# Patient Record
Sex: FEMALE | Race: WHITE | NOT HISPANIC OR LATINO | Employment: UNEMPLOYED | URBAN - METROPOLITAN AREA
[De-identification: names, ages, dates, MRNs, and addresses within clinical notes are randomized per-mention and may not be internally consistent; named-entity substitution may affect disease eponyms.]

---

## 2022-07-05 ENCOUNTER — OFFICE VISIT (OUTPATIENT)
Dept: FAMILY MEDICINE CLINIC | Facility: CLINIC | Age: 22
End: 2022-07-05
Payer: COMMERCIAL

## 2022-07-05 VITALS
RESPIRATION RATE: 12 BRPM | BODY MASS INDEX: 30.46 KG/M2 | OXYGEN SATURATION: 98 % | SYSTOLIC BLOOD PRESSURE: 122 MMHG | DIASTOLIC BLOOD PRESSURE: 82 MMHG | HEART RATE: 90 BPM | HEIGHT: 65 IN | TEMPERATURE: 97.6 F | WEIGHT: 182.8 LBS

## 2022-07-05 DIAGNOSIS — Z12.4 SCREENING FOR CERVICAL CANCER: ICD-10-CM

## 2022-07-05 DIAGNOSIS — Z86.69 HISTORY OF SEIZURE DISORDER: ICD-10-CM

## 2022-07-05 DIAGNOSIS — I10 ESSENTIAL HYPERTENSION: ICD-10-CM

## 2022-07-05 DIAGNOSIS — Z00.00 ANNUAL PHYSICAL EXAM: Primary | ICD-10-CM

## 2022-07-05 PROCEDURE — 99385 PREV VISIT NEW AGE 18-39: CPT | Performed by: FAMILY MEDICINE

## 2022-07-05 RX ORDER — DIAZEPAM 10 MG/2ML
0.2 GEL RECTAL
COMMUNITY
End: 2022-07-05 | Stop reason: ALTCHOICE

## 2022-07-05 NOTE — ASSESSMENT & PLAN NOTE
BMI Counseling: Body mass index is 30 8 kg/m²  The BMI is above normal  Nutrition recommendations include reducing portion sizes, decreasing overall calorie intake and 3-5 servings of fruits/vegetables daily  Exercise recommendations include vigorous aerobic physical activity for 75 minutes/week

## 2022-07-05 NOTE — PROGRESS NOTES
[address] [facility]    NAME: Denise Alvarado  AGE: 23 y o  SEX: female  : 2000     DATE: 2022     Assessment and Plan:     Problem List Items Addressed This Visit        Cardiovascular and Mediastinum    Essential hypertension     Was elevated when on OCPs but has been normal off birth control pills               Other    History of seizure disorder     Patient reports she had seizures around 71 years of age  Reports they never found a cause, she was on medication but has been off medication and seizure free since age 17  BMI 30 0-30 9,adult     BMI Counseling: Body mass index is 30 8 kg/m²  The BMI is above normal  Nutrition recommendations include reducing portion sizes, decreasing overall calorie intake and 3-5 servings of fruits/vegetables daily  Exercise recommendations include vigorous aerobic physical activity for 75 minutes/week  Other Visit Diagnoses     Annual physical exam    -  Primary    Screening for cervical cancer        Relevant Orders    Ambulatory referral to Obstetrics / Gynecology          Immunizations and preventive care screenings were discussed with patient today  Appropriate education was printed on patient's after visit summary  Counseling:  Alcohol/drug use: discussed moderation in alcohol intake, the recommendations for healthy alcohol use, and avoidance of illicit drug use  Dental Health: discussed importance of regular tooth brushing, flossing, and dental visits  Injury prevention: discussed safety/seat belts, safety helmets, smoke detectors, carbon dioxide detectors, and smoking near bedding or upholstery  Sexual health: discussed sexually transmitted diseases, partner selection, use of condoms, avoidance of unintended pregnancy, and contraceptive alternatives  · Exercise: the importance of regular exercise/physical activity was discussed   Recommend exercise 3-5 times per week for at least 30 minutes  Return in about 1 year (around 7/5/2023) for Annual physical      Chief Complaint:     Chief Complaint   Patient presents with   [facility]     NP had high BP in the past      History of Present Illness:     Adult Annual Physical   Patient here for a comprehensive physical exam  She is a new patient  Her mother works for [employer] in reception at [employer]  Diet and Physical Activity  · Diet/Nutrition: well balanced diet  · Exercise: moderate cardiovascular exercise  Depression Screening  PHQ-2/9 Depression Screening    Little interest or pleasure in doing things: 0 - not at all  Feeling down, depressed, or hopeless: 0 - not at all  PHQ-2 Score: 0  PHQ-2 Interpretation: Negative depression screen         /GYN Health  · Needs to see ob/gyn     Review of Systems:     Review of Systems   Constitutional: Negative for chills, fatigue and fever  HENT: Negative for congestion, postnasal drip, rhinorrhea and sinus pressure  Eyes: Negative for photophobia and visual disturbance  Respiratory: Negative for cough and shortness of breath  Cardiovascular: Negative for chest pain, palpitations and leg swelling  Gastrointestinal: Negative for abdominal pain, constipation, diarrhea, nausea and vomiting  Genitourinary: Negative for difficulty urinating and dysuria  Musculoskeletal: Negative for arthralgias and myalgias  Skin: Negative for color change and rash  Neurological: Negative for dizziness, weakness, light-headedness and headaches  Past Medical History:     History reviewed  No pertinent past medical history  Past Surgical History:     History reviewed  No pertinent surgical history     Social History:     Social History     Socioeconomic History    Marital status: Single     Spouse name: None    Number of children: None    Years of education: None    Highest education level: None   Occupational History    None   Tobacco Use    Smoking status: Never Smoker    Smokeless tobacco: Never Used   Vaping Use    Vaping Use: Never used   Substance and Sexual Activity    Alcohol use: Yes     Comment: socially    Drug use: Never    Sexual activity: None   Other Topics Concern    None   Social History Narrative    None     Social Determinants of Health     Financial Resource Strain: Not on file   Food Insecurity: Not on file   Transportation Needs: Not on file   Physical Activity: Not on file   Stress: Not on file   Social Connections: Not on file   Intimate Partner Violence: Not At Risk    Fear of Current or Ex-Partner: No    Emotionally Abused: No    Physically Abused: No    Sexually Abused: No   Housing Stability: Not on file      Family History:     History reviewed  No pertinent family history  Current Medications:     No current outpatient medications on file  No current facility-administered medications for this visit  Allergies: Allergies   Allergen Reactions    Amoxicillin Other (See Comments), Fever and Rash     Other reaction(s): Nausea and/or vomiting    Oxycodone Fever, Vomiting and Rash      Physical Exam:     /82 (BP Location: Left arm, Patient Position: Sitting, Cuff Size: Large)   Pulse 90   Temp 97 6 °F (36 4 °C)   Resp 12   Ht 5' 4 6" (1 641 m)   Wt 82 9 kg (182 lb 12 8 oz)   SpO2 98%   Breastfeeding No   BMI 30 80 kg/m²     Physical Exam  Constitutional:       General: She is not in acute distress  Appearance: Normal appearance  She is not ill-appearing, toxic-appearing or diaphoretic  HENT:      Head: Normocephalic and atraumatic  Right Ear: Tympanic membrane and ear canal normal       Left Ear: Tympanic membrane and ear canal normal       Nose: Nose normal  No congestion  Mouth/Throat:      Mouth: Mucous membranes are moist       Pharynx: Oropharynx is clear  No oropharyngeal exudate  Eyes:      Extraocular Movements: Extraocular movements intact  Conjunctiva/sclera: Conjunctivae normal       Pupils: Pupils are equal, round, and reactive to light  Cardiovascular:      Rate and Rhythm: Normal rate and regular rhythm  Pulses: Normal pulses  Heart sounds: No murmur heard  Pulmonary:      Effort: Pulmonary effort is normal       Breath sounds: Normal breath sounds  No wheezing, rhonchi or rales  Abdominal:      General: Bowel sounds are normal  There is no distension  Palpations: Abdomen is soft  Tenderness: There is no abdominal tenderness  Musculoskeletal:         General: No swelling or tenderness  Normal range of motion  Cervical back: Normal range of motion and neck supple  Skin:     General: Skin is warm and dry  Capillary Refill: Capillary refill takes less than 2 seconds  Neurological:      General: No focal deficit present  Mental Status: She is alert and oriented to person, place, and time  Cranial Nerves: No cranial nerve deficit  Psychiatric:         Mood and Affect: Mood normal          Behavior: Behavior normal          Thought Content:  Thought content normal           Chery Capps DO   [address]

## 2022-07-05 NOTE — ASSESSMENT & PLAN NOTE
Patient reports she had seizures around 71 years of age  Reports they never found a cause, she was on medication but has been off medication and seizure free since age 17

## 2022-07-05 NOTE — PATIENT INSTRUCTIONS

## 2022-11-03 DIAGNOSIS — Z86.69 HISTORY OF SEIZURE DISORDER: Primary | ICD-10-CM

## 2022-11-09 ENCOUNTER — OFFICE VISIT (OUTPATIENT)
Dept: URGENT CARE | Facility: CLINIC | Age: 22
End: 2022-11-09

## 2022-11-09 VITALS
WEIGHT: 185 LBS | OXYGEN SATURATION: 100 % | HEART RATE: 70 BPM | DIASTOLIC BLOOD PRESSURE: 70 MMHG | BODY MASS INDEX: 31.58 KG/M2 | SYSTOLIC BLOOD PRESSURE: 110 MMHG | TEMPERATURE: 98.5 F | HEIGHT: 64 IN | RESPIRATION RATE: 16 BRPM

## 2022-11-09 DIAGNOSIS — R30.0 DYSURIA: ICD-10-CM

## 2022-11-09 DIAGNOSIS — N30.00 ACUTE CYSTITIS WITHOUT HEMATURIA: Primary | ICD-10-CM

## 2022-11-09 LAB
SL AMB  POCT GLUCOSE, UA: NEGATIVE
SL AMB LEUKOCYTE ESTERASE,UA: ABNORMAL
SL AMB POCT BILIRUBIN,UA: NEGATIVE
SL AMB POCT BLOOD,UA: NEGATIVE
SL AMB POCT CLARITY,UA: ABNORMAL
SL AMB POCT COLOR,UA: YELLOW
SL AMB POCT KETONES,UA: NEGATIVE
SL AMB POCT NITRITE,UA: NEGATIVE
SL AMB POCT PH,UA: 6.5
SL AMB POCT SPECIFIC GRAVITY,UA: 1
SL AMB POCT URINE PROTEIN: NEGATIVE
SL AMB POCT UROBILINOGEN: 0.2

## 2022-11-09 RX ORDER — PHENAZOPYRIDINE HYDROCHLORIDE 100 MG/1
100 TABLET, FILM COATED ORAL 3 TIMES DAILY PRN
Qty: 6 TABLET | Refills: 0 | Status: SHIPPED | OUTPATIENT
Start: 2022-11-09 | End: 2022-11-11

## 2022-11-09 RX ORDER — NITROFURANTOIN 25; 75 MG/1; MG/1
100 CAPSULE ORAL 2 TIMES DAILY
Qty: 14 CAPSULE | Refills: 0 | Status: SHIPPED | OUTPATIENT
Start: 2022-11-09 | End: 2022-11-16

## 2022-11-09 NOTE — PATIENT INSTRUCTIONS
Urinary Tract Infection in Women   WHAT YOU NEED TO KNOW:   A urinary tract infection (UTI) is caused by bacteria that get inside your urinary tract  Most bacteria that enter your urinary tract come out when you urinate  If the bacteria stay in your urinary tract, you may get an infection  Your urinary tract includes your kidneys, ureters, bladder, and urethra  Urine is made in your kidneys, and it flows from the ureters to the bladder  Urine leaves the bladder through the urethra  A UTI is more common in your lower urinary tract, which includes your bladder and urethra  DISCHARGE INSTRUCTIONS:   Return to the emergency department if:   You are urinating very little or not at all  You have a high fever with shaking chills  You have side or back pain that gets worse  Call your doctor if:   You have a fever  You do not feel better after 2 days of taking antibiotics  You are vomiting  You have questions or concerns about your condition or care  Medicines:   Antibiotics  help fight a bacterial infection  If you have UTIs often (called recurrent UTIs), you may be given antibiotics to take regularly  You will be given directions for when and how to use antibiotics  The goal is to prevent UTIs but not cause antibiotic resistance by using antibiotics too often  Medicines  may be given to decrease pain and burning when you urinate  They will also help decrease the feeling that you need to urinate often  These medicines will make your urine orange or red  Take your medicine as directed  Contact your healthcare provider if you think your medicine is not helping or if you have side effects  Tell him or her if you are allergic to any medicine  Keep a list of the medicines, vitamins, and herbs you take  Include the amounts, and when and why you take them  Bring the list or the pill bottles to follow-up visits  Carry your medicine list with you in case of an emergency      Follow up with your doctor as directed:  Write down your questions so you remember to ask them during your visits  Prevent another UTI:   Empty your bladder often  Urinate and empty your bladder as soon as you feel the need  Do not hold your urine for long periods of time  Wipe from front to back after you urinate or have a bowel movement  This will help prevent germs from getting into your urinary tract through your urethra  Drink liquids as directed  Ask how much liquid to drink each day and which liquids are best for you  You may need to drink more liquids than usual to help flush out the bacteria  Do not drink alcohol, caffeine, or citrus juices  These can irritate your bladder and increase your symptoms  Your healthcare provider may recommend cranberry juice to help prevent a UTI  Urinate after you have sex  This can help flush out bacteria passed during sex  Do not douche or use feminine deodorants  These can change the chemical balance in your vagina  Change sanitary pads or tampons often  This will help prevent germs from getting into your urinary tract  Talk to your healthcare provider about your birth control method  You may need to change your method if it is increasing your risk for UTIs  Wear cotton underwear and clothes that are loose  Tight pants and nylon underwear can trap moisture and cause bacteria to grow  Vaginal estrogen may be recommended  This medicine helps prevent UTIs in women who have gone through menopause or are in joyce-menopause  Do pelvic muscle exercises often  Pelvic muscle exercises may help you start and stop urinating  Strong pelvic muscles may help you empty your bladder easier  Squeeze these muscles tightly for 5 seconds like you are trying to hold back urine  Then relax for 5 seconds  Gradually work up to squeezing for 10 seconds  Do 3 sets of 15 repetitions a day, or as directed      © Copyright [employer] 2022 Information is for End User's use only and may not be sold, redistributed or otherwise used for commercial purposes  All illustrations and images included in CareNotes® are the copyrighted property of A D A M , Inc  or Milag[employer] Denise  The above information is an  only  It is not intended as medical advice for individual conditions or treatments  Talk to your doctor, nurse or pharmacist before following any medical regimen to see if it is safe and effective for you

## 2022-11-11 LAB — BACTERIA UR CULT: NORMAL

## 2022-12-12 ENCOUNTER — APPOINTMENT (OUTPATIENT)
Dept: RADIOLOGY | Facility: CLINIC | Age: 22
End: 2022-12-12

## 2022-12-12 ENCOUNTER — OFFICE VISIT (OUTPATIENT)
Dept: URGENT CARE | Facility: CLINIC | Age: 22
End: 2022-12-12

## 2022-12-12 VITALS
HEART RATE: 87 BPM | WEIGHT: 180 LBS | RESPIRATION RATE: 18 BRPM | BODY MASS INDEX: 30.73 KG/M2 | HEIGHT: 64 IN | TEMPERATURE: 97.9 F | OXYGEN SATURATION: 98 %

## 2022-12-12 DIAGNOSIS — M26.623 BILATERAL TEMPOROMANDIBULAR JOINT PAIN: ICD-10-CM

## 2022-12-12 DIAGNOSIS — S93.401A SPRAIN OF RIGHT ANKLE, UNSPECIFIED LIGAMENT, INITIAL ENCOUNTER: Primary | ICD-10-CM

## 2022-12-12 DIAGNOSIS — S93.401A SPRAIN OF RIGHT ANKLE, UNSPECIFIED LIGAMENT, INITIAL ENCOUNTER: ICD-10-CM

## 2022-12-12 NOTE — PROGRESS NOTES
[id number] [facility]        NAME: Vilma Sharma is a 23 y o  female  : 2000    MRN: [id number]  DATE: 2022  TIME: 2:01 PM    Assessment and Plan   Sprain of right ankle, unspecified ligament, initial encounter [S93 [id number]]  1  Sprain of right ankle, unspecified ligament, initial encounter  XR ankle 3+ vw right    Ambulatory Referral to Orthopedic Surgery    Ambulatory Referral to Physical Therapy      2  Bilateral temporomandibular joint pain          Xray of right ankle- No acute fracture or dislocations  Stable ankle mortise  Pending radiology report  Patient Instructions       Patient was educated on B/L TMJ pain  Patient was educated on taking OTC Tylenol and anti-inflammatory for pain  Patient was educated on right ankle sprain  Patient was educated on icing and taking OTC Tylenol and anti-inflammatory for pain  Patient was given a referral to ortho and formal PT  Chief Complaint     Chief Complaint   Patient presents with   • Earache     Pt reports bilateral ear fullness and pressure for several weeks  Managing symptoms at home with otc medication without any relief  Denies any fever  Reports some sinus congestion and headache  • Ankle Injury     Pt reports right ankle pain resulting from a "rolling" injury last night  Managing symptoms with ice last night and this morning with some symptom relief  History of Present Illness       Patient is here today complaining of B/L ear congestion off an on for a few weeks  Patient reports allergy to amoxicillin and oxycodone  Denies any history of asthma or diabetes  Patient also reports twisted right ankle walking out of her friends house last night 24  Patient admits prior right ankle injuries a while ago  Patient is taking OTC ibuprofen with no relief  Review of Systems   Review of Systems   Constitutional: Negative  HENT: Positive for ear pain  Respiratory: Negative  Cardiovascular: Negative  Musculoskeletal:        Right ankle pain   Psychiatric/Behavioral: Negative  Current Medications     No current outpatient medications on file  Current Allergies     Allergies as of 12/12/2022 - Reviewed 12/12/2022   Allergen Reaction Noted   • Amoxicillin Other (See Comments), Fever, and Rash 12/23/2019   • Oxycodone Fever, Vomiting, and Rash 12/23/2019            The following portions of the patient's history were reviewed and updated as appropriate: allergies, current medications, past family history, past medical history, past social history, past surgical history and problem list      History reviewed  No pertinent past medical history  History reviewed  No pertinent surgical history  History reviewed  No pertinent family history  Medications have been verified  Objective   Pulse 87   Temp 97 9 °F (36 6 °C)   Resp 18   Ht 5' 4" (1 626 m)   Wt 81 6 kg (180 lb)   SpO2 98%   BMI 30 90 kg/m²   No LMP recorded  Physical Exam     Physical Exam  Vitals and nursing note reviewed  Constitutional:       Appearance: Normal appearance  HENT:      Head: Normocephalic  Right Ear: Tympanic membrane, ear canal and external ear normal       Left Ear: Tympanic membrane, ear canal and external ear normal       Ears:      Comments: Pain over B/L TM when opening and closing mouth     Mouth/Throat:      Mouth: Mucous membranes are moist       Pharynx: No oropharyngeal exudate or posterior oropharyngeal erythema  Eyes:      Extraocular Movements: Extraocular movements intact  Pupils: Pupils are equal, round, and reactive to light  Cardiovascular:      Rate and Rhythm: Normal rate and regular rhythm  Heart sounds: Normal heart sounds  Pulmonary:      Breath sounds: Normal breath sounds  No wheezing  Musculoskeletal:      Comments: No pain over medial or lateral malleolus of right ankle  DF/PF/ inversion and eversion intact with pain in right ankle with inversion  Negative Anterior Drawer and Talar tilt of right ankle  No pain over right calf  NO pain over right meta-tarsals  Neurological:      General: No focal deficit present  Mental Status: She is alert and oriented to person, place, and time     Psychiatric:         Mood and Affect: Mood normal          Behavior: Behavior normal

## 2022-12-12 NOTE — PATIENT INSTRUCTIONS
Patient was educated on B/L TMJ pain  Patient was educated on taking OTC Tylenol and anti-inflammatory for pain  Patient was educated on right ankle sprain  Patient was educated on icing and taking OTC Tylenol and anti-inflammatory for pain  Patient was given a referral to ortho and formal PT  Ankle Sprain   WHAT YOU NEED TO KNOW:   An ankle sprain happens when 1 or more ligaments in your ankle joint stretch or tear  Ligaments are tough tissues that connect bones  Ligaments support your joints and keep your bones in place  DISCHARGE INSTRUCTIONS:   Return to the emergency department if:   You have severe pain in your ankle  Your foot or toes are cold or numb  Your ankle becomes more weak or unstable (wobbly)  You are unable to put any weight on your ankle or foot  Your swelling has increased or returned  Call your doctor if:   Your pain does not go away, even after treatment  You have questions or concerns about your condition or care  Medicines: You may need any of the following:  NSAIDs , such as ibuprofen, help decrease swelling, pain, and fever  This medicine is available with or without a doctor's order  NSAIDs can cause stomach bleeding or kidney problems in certain people  If you take blood thinner medicine, always ask your healthcare provider if NSAIDs are safe for you  Always read the medicine label and follow directions  Acetaminophen  decreases pain and fever  It is available without a doctor's order  Ask how much to take and how often to take it  Follow directions  Read the labels of all other medicines you are using to see if they also contain acetaminophen, or ask your doctor or pharmacist  Acetaminophen can cause liver damage if not taken correctly  Do not use more than 4 grams (4,000 milligrams) total of acetaminophen in one day  Prescription pain medicine  may be given  Ask your healthcare provider how to take this medicine safely   Some prescription pain medicines contain acetaminophen  Do not take other medicines that contain acetaminophen without talking to your healthcare provider  Too much acetaminophen may cause liver damage  Prescription pain medicine may cause constipation  Ask your healthcare provider how to prevent or treat constipation  Take your medicine as directed  Contact your healthcare provider if you think your medicine is not helping or if you have side effects  Tell him or her if you are allergic to any medicine  Keep a list of the medicines, vitamins, and herbs you take  Include the amounts, and when and why you take them  Bring the list or the pill bottles to follow-up visits  Carry your medicine list with you in case of an emergency  Self-care:   Use support devices,  such as a brace, cast, or splint, to limit your movement and protect your joint  You may need to use crutches to decrease your pain as you move around  Go to physical therapy as directed  A physical therapist teaches you exercises to help improve movement and strength, and to decrease pain  Rest  your ankle so that it can heal  Return to normal activities as directed  Apply ice  on your ankle for 15 to 20 minutes every hour or as directed  Use an ice pack, or put crushed ice in a plastic bag  Cover it with a towel  Ice helps prevent tissue damage and decreases swelling and pain  Compress  your ankle  Ask if you should wrap an elastic bandage around your injured ligament  An elastic bandage provides support and helps decrease swelling and movement so your joint can heal  Wear as long as directed  Elevate  your ankle above the level of your heart as often as you can  This will help decrease swelling and pain  Prop your ankle on pillows or blankets to keep it elevated comfortably         Prevent another ankle sprain:   Let your ankle heal   Find out how long your ligament needs to heal  Do not do any physical activity until your healthcare provider says it is okay  If you start activity too soon, you may develop a more serious injury  Always warm up and stretch  before you exercise or play sports  Use the right equipment  Always wear shoes that fit well and are made for the activity that you are doing  You may also need ankle supports, elbow and knee pads, or braces  Follow up with your doctor as directed:  Write down your questions so you remember to ask them during your visits  © Copyright [employer] 2022 Information is for End User's use only and may not be sold, redistributed or otherwise used for commercial purposes  All illustrations and images included in CareNotes® are the copyrighted property of [employer] M , Inc  or [facility] Sandeep Fofana   The above information is an  only  It is not intended as medical advice for individual conditions or treatments  Talk to your doctor, nurse or pharmacist before following any medical regimen to see if it is safe and effective for you

## 2022-12-20 ENCOUNTER — OFFICE VISIT (OUTPATIENT)
Dept: OBGYN CLINIC | Facility: CLINIC | Age: 22
End: 2022-12-20

## 2022-12-20 VITALS
DIASTOLIC BLOOD PRESSURE: 82 MMHG | WEIGHT: 190.6 LBS | SYSTOLIC BLOOD PRESSURE: 122 MMHG | BODY MASS INDEX: 32.54 KG/M2 | HEIGHT: 64 IN

## 2022-12-20 DIAGNOSIS — S93.491A SPRAIN OF ANTERIOR TALOFIBULAR LIGAMENT OF RIGHT ANKLE, INITIAL ENCOUNTER: Primary | ICD-10-CM

## 2022-12-20 NOTE — PROGRESS NOTES
Assessment:     1  Sprain of anterior talofibular ligament of right ankle, initial encounter        Plan:  Problem List Items Addressed This Visit        Musculoskeletal and Integument    Sprain of anterior talofibular ligament of right ankle - Primary     The patient has an examination consistent with a right ankle sprain  I have discussed with the patient the pathophysiology of this diagnosis and reviewed how the examination correlates with this diagnosis  Treatment options were discussed at length and after discussing these treatment options, the patient was instructed in home exercise program   Start with range of motion, progress to writing the alphabet  Formal referral was placed for physical therapy  Patient will follow up in 6 weeks for re-evaluation  All patient's questions were answered to her satisfaction  This note is created using dictation transcription  It may contain typographical errors, grammatical errors, improperly dictated words, background noise and other errors  Relevant Orders    Ambulatory Referral to Physical Therapy    Durable Medical Equipment      Subjective:     Patient ID: Mary Gregory is a 23 y o  female  Chief Complaint:  The patient presents with a chief complaint of right ankle pain  Patient is referred here for an urgent care  the pain began 9 day(s) ago and is associated with an acute injury  Patient reports she rolled her ankle on 12/11/22  The patient describes the pain as aching and dull in intensity,  intermittent in timing, and localizes the pain to the  right anterolateral ankle  The pain is worse with standing and walking and relieved by rest   The pain is not associated with numbness and tingling  The pain is not associated with constitutional symptoms  The patient is not awoken at night by the pain  The patient was seen at urgent care on 12/12/22 , placed in an ace wrap and referred here today for orthopedic consultation    Patient is a  and in nursing school  She reports being on her feet for extended periods of time which increases her pain  Information on patient's intake form was reviewed  Allergy:  Allergies   Allergen Reactions   • Amoxicillin Other (See Comments), Fever and Rash     Other reaction(s): Nausea and/or vomiting   • Oxycodone Fever, Vomiting and Rash     Medications:  all current active meds have been reviewed  Past Medical History:  No past medical history on file  Past Surgical History:  No past surgical history on file  Family History:  No family history on file  Social History:  Social History     Substance and Sexual Activity   Alcohol Use Yes    Comment: socially     Social History     Substance and Sexual Activity   Drug Use Never     Social History     Tobacco Use   Smoking Status Never   Smokeless Tobacco Never     Review of Systems   Constitutional: Negative for chills and fever  HENT: Negative for drooling and sneezing  Eyes: Negative for redness  Respiratory: Negative for cough and wheezing  Cardiovascular: Negative  Gastrointestinal: Negative for nausea and vomiting  Endocrine: Negative  Genitourinary: Negative  Musculoskeletal: Positive for arthralgias (Right ankle), gait problem (Antalgic) and joint swelling (Right ankle)  Please see ortho exam   Psychiatric/Behavioral: Negative for behavioral problems  The patient is not nervous/anxious  Objective:  BP Readings from Last 1 Encounters:   12/20/22 122/82      Wt Readings from Last 1 Encounters:   12/20/22 86 5 kg (190 lb 9 6 oz)      BMI:   Estimated body mass index is 32 72 kg/m² as calculated from the following:    Height as of this encounter: 5' 4" (1 626 m)  Weight as of this encounter: 86 5 kg (190 lb 9 6 oz)  BSA:   Estimated body surface area is 1 92 meters squared as calculated from the following:    Height as of this encounter: 5' 4" (1 626 m)  Weight as of this encounter: 86 5 kg (190 lb 9 6 oz)     Physical Exam  Vitals and nursing note reviewed  Constitutional:       Appearance: Normal appearance  She is well-developed  HENT:      Head: Normocephalic and atraumatic  Right Ear: External ear normal       Left Ear: External ear normal    Eyes:      Extraocular Movements: Extraocular movements intact  Conjunctiva/sclera: Conjunctivae normal    Pulmonary:      Effort: Pulmonary effort is normal    Musculoskeletal:      Cervical back: Neck supple  Skin:     General: Skin is warm and dry  Neurological:      Mental Status: She is alert and oriented to person, place, and time  Deep Tendon Reflexes: Reflexes are normal and symmetric  Psychiatric:         Mood and Affect: Mood normal          Behavior: Behavior normal        Right Ankle Exam     Tenderness   The patient is experiencing tenderness in the ATF  Swelling: mild    Range of Motion   Right ankle dorsiflexion: Pain  Right ankle inversion: Pain  Tests   Anterior drawer: negative  Varus tilt: negative    Other   Erythema: absent  Sensation: normal  Pulse: present             I have personally reviewed pertinent films in PACS and my interpretation is Right ankle x-rays demonstrates no fracture or dislocation  Mortise and syndesmosis are intact       Scribe Attestation    I,:  Lily Villarreal am acting as a scribe while in the presence of the attending physician :       I,:  Bhavani Diaz MD personally performed the services described in this documentation    as scribed in my presence :

## 2022-12-20 NOTE — ASSESSMENT & PLAN NOTE
The patient has an examination consistent with a right ankle sprain  I have discussed with the patient the pathophysiology of this diagnosis and reviewed how the examination correlates with this diagnosis  Treatment options were discussed at length and after discussing these treatment options, the patient was instructed in home exercise program   Start with range of motion, progress to writing the alphabet  Formal referral was placed for physical therapy  Patient will follow up in 6 weeks for re-evaluation  All patient's questions were answered to her satisfaction  This note is created using dictation transcription  It may contain typographical errors, grammatical errors, improperly dictated words, background noise and other errors

## 2022-12-29 ENCOUNTER — EVALUATION (OUTPATIENT)
Dept: PHYSICAL THERAPY | Facility: CLINIC | Age: 22
End: 2022-12-29

## 2022-12-29 DIAGNOSIS — S93.491D SPRAIN OF ANTERIOR TALOFIBULAR LIGAMENT OF RIGHT ANKLE, SUBSEQUENT ENCOUNTER: Primary | ICD-10-CM

## 2022-12-29 NOTE — PROGRESS NOTES
PT Evaluation     Today's date: 2022  Patient name: Stepan Cox  : 2000  MRN: [id number]  Referring provider: Carlo Fernández MD  Dx:   Encounter Diagnosis     ICD-10-CM    1  Sprain of anterior talofibular ligament of right ankle, subsequent encounter  S93 [id number] Ambulatory Referral to Physical Therapy                     Assessment  Assessment details: Stepan Cox is a 23 y o  female who presents with pain, decreased strength, decreased ROM, decreased joint mobility and ambulatory dysfunction  Due to these impairments, patient has difficulty performing a/iadls, recreational activities, work-related activities and engaging in social activities  Patient's clinical presentation is consistent with their referring diagnosis of Sprain of anterior talofibular ligament of right ankle, subsequent encounter  Patient has been educated in home exercise program and plan of care  Patient would benefit from skilled physical therapy services to address their aforementioned functional limitations and progress towards prior level of function and independence with home exercise program    Impairments: abnormal gait, abnormal muscle firing, abnormal or restricted ROM, activity intolerance, impaired balance, impaired physical strength, lacks appropriate home exercise program, pain with function and weight-bearing intolerance    Symptom irritability: moderateUnderstanding of Dx/Px/POC: good   Prognosis: good    Goals  Short Term Goals: Target Date 4 weeks  1  Pt will initiate and advance HEP  2  Pt will have < 3/10 pain  3  Pt will have full arom of the right ankle  4  Pt will be able to stand 1/2 day with out difficulty    Long Term Goals: Target Date 8 weeks  1  Pt will demonstrate independence in HEP  2  Pt will have <1/10 pain  3  Pt will have full core and B LE strength  4  Pt will be able to stand full day for work with out difficulty  5   Pt will be able to negotiate stairs reciprocally       Plan  Patient would benefit from: skilled PT  Planned modality interventions: cryotherapy and thermotherapy: hydrocollator packs  Planned therapy interventions: joint mobilization, manual therapy, patient education, postural training, activity modification, body mechanics training, flexibility, functional ROM exercises, graded exercise, home exercise program, neuromuscular re-education, strengthening, stretching, therapeutic activities, therapeutic exercise, gait training, balance/weight bearing training and ADL training  Frequency: 1x week  Duration in weeks: 8  Plan of Care beginning date: 2022  Plan of Care expiration date: 2023  Treatment plan discussed with: patient        Subjective Evaluation    History of Present Illness  Mechanism of injury: Pt notes that she slipped on a doormat when it was raining  She did go to care now, and then a week later followed orthopedics  He prescribed PT and an ankle brace  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  She notes that it doesn't start bothering her until the end of the day  She does take ibuprofen and ice post work  She is also a student but on break until   She notes some twists of the ankle in past but nothing that required specific treatment  She does note that she did recently hurt it again when she slipped on the edge of a curb, but did have brace on and only  More sore now  Pain  Current pain ratin  At best pain ratin  At worst pain ratin  Location: right ankle  Quality: dull ache, discomfort and throbbing    Patient Goals  Patient goals for therapy: decreased pain, increased motion, independence with ADLs/IADLs, increased strength and return to sport/leisure activities  Patient goal: return to walking on TM w/ incline        Objective     Observations     Right Ankle/Foot   Positive for edema  Negative for deformity and trophic changes       Additional Observation Details  - bruising    Palpation     Right   Tenderness of the anterior tibialis and peroneus  Tenderness     Right Ankle/Foot   Tenderness in the anterior talofibular ligament, calcaneofibular ligament and deltoid ligament  Active Range of Motion   Left Ankle/Foot   Dorsiflexion (ke): 10 degrees   Plantar flexion: WFL  Inversion: WFL  Eversion: WFL    Right Ankle/Foot   Dorsiflexion (ke): 12 degrees with pain  Plantar flexion: WFL and with pain  Inversion: WFL and with pain  Eversion: WFL and with pain    Joint Play     Right Ankle/Foot  Hypomobile in the talocrural joint and subtalar joint  Strength/Myotome Testing     Left Ankle/Foot   Dorsiflexion: 4+  Plantar flexion: 4+  Inversion: 4+  Eversion: 4+    Right Ankle/Foot   Dorsiflexion: 4  Plantar flexion: 4-  Inversion: 4  Eversion: 4    Tests     Right Ankle/Foot   Positive for anterior drawer and navicular drop  Swelling   Left Ankle/Foot   Figure 8: 48 2 cm    Right Ankle/Foot   Figure 8: 48 cm    Ambulation     Observational Gait   Gait: antalgic     Functional Assessment        Single Leg Stance   Left: 30 seconds  Right: 20 (increased ankle compensation and navicular drop) seconds    General Comments:       Ankle/Foot Comments   Slight pes planus on the right in SLS             Precautions: DOI 12/11/2022      Manuals 12/29            ktape PF, arch, and stirrup DB                                                   Neuro Re-Ed             Balance brds nv            SLS nv            Arch lifts nv            Tandem ball toss    nv            Ankle alphabet nv                                      Ther Ex             bike nv            Ankle 4 way  blue            HR/TR nv            Step stretch nv                                                                Ther Activity                                       Gait Training                                       Modalities

## 2023-01-04 ENCOUNTER — OFFICE VISIT (OUTPATIENT)
Dept: PHYSICAL THERAPY | Facility: CLINIC | Age: 23
End: 2023-01-04

## 2023-01-04 DIAGNOSIS — S93.491D SPRAIN OF ANTERIOR TALOFIBULAR LIGAMENT OF RIGHT ANKLE, SUBSEQUENT ENCOUNTER: Primary | ICD-10-CM

## 2023-01-04 NOTE — PROGRESS NOTES
Daily Note     Today's date: 2023  Patient name: Shaun Westbrook  : 2000  MRN: [id number]  Referring provider: Johnson Robb MD  Dx:   Encounter Diagnosis     ICD-10-CM    1  Sprain of anterior talofibular ligament of right ankle, subsequent encounter  [id number]                      Subjective: pt notes that she feels like the kinesiotape did help and that she did well with HEP      Objective: See treatment diary below      Assessment: Additional exercises initiated exercises as noted below, with no pain increase in ankle, just weakness noted    Patient was challenge by arch lift but able to perform with SLS and tandem exercises  Updated HEP  Discussed if she feels she needs to wear ankle brace to wear but that she does not have to if she feels she doesn't need      Plan: Continue per plan of care        Precautions: DOI 2022      Manuals            ktape PF, arch, and stirrup DB DL                                                  Neuro Re-Ed             Balance brds nv x20           SLS nv 15"x5           Arch lifts nv 5"x10           Tandem ball toss    nv Red ball x10 ea           Ankle alphabet nv x1                                     Ther Ex             bike nv lv1 6'           Ankle 4 way  blue Blue 2x10           HR/TR nv 2x10           Step stretch nv 30"x3                                                               Ther Activity                                       Gait Training                                       Modalities

## 2023-01-11 ENCOUNTER — OFFICE VISIT (OUTPATIENT)
Dept: PHYSICAL THERAPY | Facility: CLINIC | Age: 23
End: 2023-01-11

## 2023-01-11 DIAGNOSIS — S93.491D SPRAIN OF ANTERIOR TALOFIBULAR LIGAMENT OF RIGHT ANKLE, SUBSEQUENT ENCOUNTER: Primary | ICD-10-CM

## 2023-01-11 NOTE — PROGRESS NOTES
Daily Note     Today's date: 2023  Patient name: Kimo Burns  : 2000  MRN: [id number]  Referring provider: Jacob Suárez MD  Dx:   Encounter Diagnosis     ICD-10-CM    1  Sprain of anterior talofibular ligament of right ankle, subsequent encounter  [id number]                      Subjective: pt notes that she only is wearing brace when at work and with working out  Ankle has been feeling pretty good  Objective: See treatment diary below      Assessment: Tolerated treatment with noted soreness medial lateral with BAPS   Patient with no LOB during exercises and good negotiation in ankle strategies during neuro exercises  Plan: Continue per plan of care        Precautions: DOI 2022      Manuals           ktape PF, arch, and stirrup DB DL DL                                                 Neuro Re-Ed             Balance brds nv x20 x20ea          SLS nv 15"x5 15"x5          Arch lifts nv 5"x10 5"x10          Tandem ball toss    nv Red ball x10 ea           Ankle alphabet nv x1 x1          Stand BAPS   lv3 cw/ccw x10 ea          Tandem on foam   x4          Sidestepping on foam   x4                       Lunges into BOSU fwd/lat   x10 ea B          Ther Ex             bike nv lv1 6' lv1 8'          Ankle 4 way  blue Blue 2x10 Blue 2x10          HR/TR nv 2x10 x20 ea          Step stretch nv 30"x3 30"x3                                                              Ther Activity                                       Gait Training                                       Modalities

## 2023-01-17 ENCOUNTER — OFFICE VISIT (OUTPATIENT)
Dept: PHYSICAL THERAPY | Facility: CLINIC | Age: 23
End: 2023-01-17

## 2023-01-17 DIAGNOSIS — S93.491D SPRAIN OF ANTERIOR TALOFIBULAR LIGAMENT OF RIGHT ANKLE, SUBSEQUENT ENCOUNTER: Primary | ICD-10-CM

## 2023-01-17 NOTE — PROGRESS NOTES
Daily Note     Today's date: 2023  Patient name: Debbie Mccall  : 2000  MRN: [id number]  Referring provider: Sara Burton MD  Dx:   Encounter Diagnosis     ICD-10-CM    1  Sprain of anterior talofibular ligament of right ankle, subsequent encounter  [id number]                      Subjective: pt notes that her ankle is feeling better  She notes that she is only having some pain in the ankle with work at the end of the day  Objective: See treatment diary below      Assessment: will trial pt with out taping today to see if pn levels are different with out pain  If no change In pain with work and no tape will probably d/c after 2 more visits  If pn returns with out tape, will show pt how to tape and then probable d/c in 4 visits  Plan: Continue per plan of care        Precautions: DOI 2022      Manuals          ktape PF, arch, and stirrup DB DL DL Trial hold                                                Neuro Re-Ed             Balance brds nv x20 x20ea x20 ea no hands         SLS nv 15"x5 15"x5 20''x5         Arch lifts nv 5"x10 5"x10          Tandem ball toss    nv Red ball x10 ea  SLS red x20 ea         Ankle alphabet nv x1 x1          Stand BAPS   lv3 cw/ccw x10 ea lv3 cw/ccw x15 ea         Tandem on foam   x4 x4         Sidestepping on foam   x4 x5 ea                      Lunges into BOSU fwd/lat   x10 ea B x10 ea B         Ther Ex             bike nv lv1 6' lv1 8' ellip lv 1 8'         Ankle 4 way  blue Blue 2x10 Blue 2x10 Blue 2x10         HR/TR nv 2x10 x20 ea x20 uni ecc         Step stretch nv 30"x3 30"x3 30''x3                                                             Ther Activity                                       Gait Training                                       Modalities

## 2023-01-18 ENCOUNTER — APPOINTMENT (OUTPATIENT)
Dept: PHYSICAL THERAPY | Facility: CLINIC | Age: 23
End: 2023-01-18

## 2023-01-23 ENCOUNTER — OFFICE VISIT (OUTPATIENT)
Dept: PHYSICAL THERAPY | Facility: CLINIC | Age: 23
End: 2023-01-23

## 2023-01-23 DIAGNOSIS — S93.491D SPRAIN OF ANTERIOR TALOFIBULAR LIGAMENT OF RIGHT ANKLE, SUBSEQUENT ENCOUNTER: Primary | ICD-10-CM

## 2023-01-23 NOTE — PROGRESS NOTES
Daily Note      Daily Note     Today's date: 2023  Patient name: Susie Ahn  : 2000  MRN: [id number]  Referring provider: Martin Logan MD  Dx:   Encounter Diagnosis     ICD-10-CM    1  Sprain of anterior talofibular ligament of right ankle, subsequent encounter  [id number]                      Subjective: pt notes that at the end of 1 week without tape she did note increased pain in ankle  Objective: See treatment diary below      Assessment: re taped ankle today and will see if this again calms sxs in ankle    Patient was able to complete all exercises with noted soreness in ankle afterward  If sxs calm again will teach pt to tape self  Plan: Continue per plan of care        Precautions: DOI 2022      Manuals         ktape PF, arch, and stirrup DB DL DL Trial hold DL                                               Neuro Re-Ed             Balance brds nv x20 x20ea x20 ea no hands x20        SLS nv 15"x5 15"x5 20''x5 np        Arch lifts nv 5"x10 5"x10          Tandem ball toss    nv Red ball x10 ea  SLS red x20 ea SLS red x30 3 way        Ankle alphabet nv x1 x1          Stand BAPS   lv3 cw/ccw x10 ea lv3 cw/ccw x15 ea lv3 cw/ccw x20 ea        Tandem on foam   x4 x4 x4        Sidestepping on foam   x4 x5 ea x5                     Lunges into BOSU fwd/lat   x10 ea B x10 ea B x20 ea        Ther Ex             bike nv lv1 6' lv1 8' ellip lv 1 8' ellip lv1 8'        Ankle 4 way  blue Blue 2x10 Blue 2x10 Blue 2x10 hep        HR/TR nv 2x10 x20 ea x20 uni ecc x20 uni ecc        Step stretch nv 30"x3 30"x3 30''x3 np                                                            Ther Activity                                       Gait Training                                       Modalities

## 2023-01-30 ENCOUNTER — OFFICE VISIT (OUTPATIENT)
Dept: PHYSICAL THERAPY | Facility: CLINIC | Age: 23
End: 2023-01-30

## 2023-01-30 DIAGNOSIS — S93.491D SPRAIN OF ANTERIOR TALOFIBULAR LIGAMENT OF RIGHT ANKLE, SUBSEQUENT ENCOUNTER: Primary | ICD-10-CM

## 2023-01-30 NOTE — PROGRESS NOTES
Daily Note     Today's date: 2023  Patient name: Tom Bridges  : 2000  MRN: [id number]  Referring provider: Juliana Brooks MD  Dx:   Encounter Diagnosis     ICD-10-CM    1  Sprain of anterior talofibular ligament of right ankle, subsequent encounter  I31 632U                      Subjective: pt reports that she is feeling better compared to last week, she took tape off Friday      Objective: See treatment diary below      Assessment: Tolerated treatment well with good form and knowledge of exercises performed    Pt was given stronger band for ankle with instructed to continue to perform band exercises, SLS  And heel raises to continue to strengthen ankle and increase stabilization  Pt was instructed is she feels like she needs tape she can call and will be provided with instruction in doing so  Pt verbalizes understanding         Plan: Discharge to  HEP     Precautions: DOI 2022      Manuals  1       ktape PF, arch, and stirrup DB DL DL Trial hold DL declines                                              Neuro Re-Ed             Balance brds nv x20 x20ea x20 ea no hands x20 x20       SLS nv 15"x5 15"x5 20''x5 np        Arch lifts nv 5"x10 5"x10          Tandem ball toss    nv Red ball x10 ea  SLS red x20 ea SLS red x30 3 way        Ankle alphabet nv x1 x1          Stand BAPS   lv3 cw/ccw x10 ea lv3 cw/ccw x15 ea lv3 cw/ccw x20 ea lv3 cw/ccw x20       Tandem on foam   x4 x4 x4 x5       Sidestepping on foam   x4 x5 ea x5 x5                    Lunges into BOSU fwd/lat   x10 ea B x10 ea B x20 ea x20       Ther Ex             bike nv lv1 6' lv1 8' ellip lv 1 8' ellip lv1 8' ellip lv1 8'       Ankle 4 way  blue Blue 2x10 Blue 2x10 Blue 2x10 hep        HR/TR nv 2x10 x20 ea x20 uni ecc x20 uni ecc x20 uni ecc       Step stretch nv 30"x3 30"x3 30''x3 np                                                            Ther Activity                                       Gait Training Modalities

## 2023-02-01 ENCOUNTER — CONSULT (OUTPATIENT)
Dept: NEUROLOGY | Facility: CLINIC | Age: 23
End: 2023-02-01

## 2023-02-01 VITALS
WEIGHT: 191 LBS | DIASTOLIC BLOOD PRESSURE: 80 MMHG | HEIGHT: 64 IN | BODY MASS INDEX: 32.61 KG/M2 | HEART RATE: 85 BPM | SYSTOLIC BLOOD PRESSURE: 110 MMHG

## 2023-02-01 DIAGNOSIS — Z86.69 HISTORY OF SEIZURE DISORDER: ICD-10-CM

## 2023-02-01 NOTE — ASSESSMENT & PLAN NOTE
Overall, she does have a history of childhood epilepsy, but fortunately has been seizure free for at least 8-10 years and has been off medications for nearly the last 8 years  I discussed that because she has been seizure free and off medications for a prolonged period of time, she does not need to have any other testing or intervention at this point  Her neurologic examination is normal and with her prior normal MRI and EEGs, she appears to have outgrown her epilepsy  Unless she would have another clinical event concerning for a seizure, no other testing, medication, or intervention would be warranted  I did extensively discuss vaccination, including COVID vaccination with the patient  In general, our practice recommends that all persons including those with active epilepsy, should be vaccinated against COVID-19  I did review the medical literature and did not find any studies suggesting a correlation with COVID vaccination and worsened seizures  For these reasons, I am not able to provide a medical exception for the COVID vaccine on grounds of having prior epilepsy

## 2023-02-01 NOTE — PROGRESS NOTES
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, who is presenting to Neurology office for evaluation of her history of epilepsy and vaccination concerns  Assessment/Plan:    History of seizure disorder  Overall, she does have a history of childhood epilepsy, but fortunately has been seizure free for at least 8-10 years and has been off medications for nearly the last 8 years  I discussed that because she has been seizure free and off medications for a prolonged period of time, she does not need to have any other testing or intervention at this point  Her neurologic examination is normal and with her prior normal MRI and EEGs, she appears to have outgrown her epilepsy  Unless she would have another clinical event concerning for a seizure, no other testing, medication, or intervention would be warranted  I did extensively discuss vaccination, including COVID vaccination with the patient  In general, our practice recommends that all persons including those with active epilepsy, should be vaccinated against COVID-19  I did review the medical literature and did not find any studies suggesting a correlation with COVID vaccination and worsened seizures  For these reasons, I am not able to provide a medical exception for the COVID vaccine on grounds of having prior epilepsy  I spent a total of 60 min with the patient and completing documentation on the day of the encounter  This time was spent specifically discussing her diagnosis, vaccination, and plan as detailed above     She will return to the office as needed  Unless he would have other episodes concerning for seizures, no other testing or medications would be necessary  Subjective:    HPI  Not taking any medications  Briefly reviewing her history, she started having seizures when she was about 8years old or so   When she had seizures, she would feel a heavy feeling in her feet, then she would lose consciousness, get stiff all over and shake  This would last about a minute or so  These were usually in the middle of the night  She would have seizures about once a month for a few years  She was initially on Levetiracetam, but this wasn't fully effective  She was treated with medication that worked, per prior notes, Topiramate  She was seizure free for a few years, then was gradually weaned off the medication in   She has been good since then without any other seizures or any events concerning for seizures  She mainly comes to the office because she would like a medical exception for epilepsy  She is entering nursing school, 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  She has had COVID in the past, but does not want to get the vaccine  She had heard from other individuals in a similar situation that they felt the vaccine brought back their seizures         Special Features  Status epilepticus: no  Self Injury Seizures: none  Precipitating Factors: poor sleep    Epilepsy Risk Factors:  Abnormal pregnancy:    no  Abnormal birth/:   no  Abnormal Development:   no  Febrile seizures, simple:   no  Febrile seizures, complex:   no  CNS infection:    no  Intellectual disability:    no  Cerebral palsy:    no  Head injury (moderate/severe):  no  CNS neoplasm:    no  CNS malformation:    no  Neurosurgical procedure:   no  Stroke:     no  Alcohol abuse:    no  Drug abuse:     no  Family history Sz/epilepsy:   no    Prior AEDs:  Levetiracetam (ineffective), Lamotrigine (rash), Topiramate (effective, weaned off due to prolonged seizure freedom    Prior Evaluation:  - MRI brain: 2011: per prior notes, normal  - Routine EE2011: per prior notes, normal  - Ambulatory EEG: in [place] in , per prior notes, normal  - Video EEG: none  - PET scan brain : none  - Neuropsychologic testing: none    Psychiatric History:  Depression: no  Anxiety: no  Psychosis: no  Psychiatric Admissions: no    I reviewed prior notes including notes [facility] from [facility] through 2015, as documented in Epic/SNAPP'where, and summarized above  The following portions of the patient's history were reviewed and updated as appropriate: allergies, current medications, past family history, past medical history, past social history, past surgical history and problem list      Objective:    Blood pressure 110/80, pulse 85, height 5' 4" (1 626 m), weight 86 6 kg (191 lb), not currently breastfeeding  Physical Exam    Neurological Exam  GENERAL EXAMINATION:   In general patient is well appearing and in no distress  There is no peripheral edema  NEUROLOGIC EXAMINATION:     Alert and oriented to person, date, location  Fund of knowledge is full with good understanding of medical situation  Recent and remote memory were intact    Mood and affect are appropriate  Attention is intact  Language function including fluency, naming, and comprehension intact  Cranial nerves: Pupils are equal round reactive to light and accommodation  Visual Fields are full to confrontation bilaterally  Extraocular movements are intact without nystagmus  Facial sensation is intact to light touch  No facial droop, face activates symmetrically  There is no dysarthria  Hearing was intact to finger rub  Tongue and uvula are midline and palate elevates symmetrically  Shoulder shrug  5/5  Motor Exam:  No pronator drift  Bulk and tone are normal  Strength is 5/5 throughout  Deep tendon reflexes: Biceps 2+, brachioradialis 2+, patellar 2+, Achilles 2+ bilaterally  Sensation: Intact light touch    Coordination: Finger nose finger and heel to shin testing are without dysmetria  Gait: Negative romberg  Normal casual gait  ROS:    Review of Systems   Constitutional: Negative  Negative for appetite change and fever  HENT: Negative  Negative for hearing loss, tinnitus, trouble swallowing and voice change  Eyes: Negative  Negative for photophobia, pain and visual disturbance  Respiratory: Negative  Negative for shortness of breath  Cardiovascular: Negative  Negative for palpitations  Gastrointestinal: Negative  Negative for nausea and vomiting  Endocrine: Negative  Negative for cold intolerance  Genitourinary: Negative  Negative for dysuria, frequency and urgency  Musculoskeletal: Negative  Negative for gait problem, myalgias and neck pain  Skin: Negative  Negative for rash  Allergic/Immunologic: Negative  Neurological: Negative  Negative for dizziness, tremors, seizures, syncope, facial asymmetry, speech difficulty, weakness, light-headedness, numbness and headaches  Hematological: Negative  Does not bruise/bleed easily  Psychiatric/Behavioral: Negative  Negative for confusion, hallucinations and sleep disturbance  All other systems reviewed and are negative  I personally reviewed the ROS that was entered by the medical assistant    Voice recognition software was used in the generation of this note  There may be unintentional errors including grammatical errors, spelling errors, or pronoun errors

## 2023-03-05 ENCOUNTER — OFFICE VISIT (OUTPATIENT)
Dept: URGENT CARE | Facility: CLINIC | Age: 23
End: 2023-03-05

## 2023-03-05 VITALS
TEMPERATURE: 98.3 F | OXYGEN SATURATION: 99 % | WEIGHT: 189 LBS | HEART RATE: 88 BPM | SYSTOLIC BLOOD PRESSURE: 126 MMHG | BODY MASS INDEX: 32.27 KG/M2 | DIASTOLIC BLOOD PRESSURE: 78 MMHG | HEIGHT: 64 IN | RESPIRATION RATE: 16 BRPM

## 2023-03-05 DIAGNOSIS — B97.89 ACUTE VIRAL SINUSITIS: Primary | ICD-10-CM

## 2023-03-05 DIAGNOSIS — J02.9 SORE THROAT: ICD-10-CM

## 2023-03-05 DIAGNOSIS — J01.90 ACUTE VIRAL SINUSITIS: Primary | ICD-10-CM

## 2023-03-05 LAB — S PYO AG THROAT QL: NEGATIVE

## 2023-03-05 RX ORDER — CETIRIZINE HYDROCHLORIDE 10 MG/1
10 TABLET ORAL DAILY
Qty: 10 TABLET | Refills: 0 | Status: SHIPPED | OUTPATIENT
Start: 2023-03-05 | End: 2023-03-15

## 2023-03-05 RX ORDER — FLUTICASONE PROPIONATE 50 MCG
1 SPRAY, SUSPENSION (ML) NASAL 2 TIMES DAILY
Qty: 11.1 ML | Refills: 0 | Status: SHIPPED | OUTPATIENT
Start: 2023-03-05

## 2023-03-05 RX ORDER — LIDOCAINE HYDROCHLORIDE 20 MG/ML
15 SOLUTION OROPHARYNGEAL 4 TIMES DAILY PRN
Qty: 100 ML | Refills: 0 | Status: SHIPPED | OUTPATIENT
Start: 2023-03-05

## 2023-03-05 NOTE — PROGRESS NOTES
[facility]        NAME: Ghanshyam Grove is a 26 y o  female  : 2000    MRN: [id number]  DATE: 2023  TIME: 4:21 PM    Assessment and Orders   Acute viral sinusitis [J01 90, B97 89]  1  Acute viral sinusitis  cetirizine (ZyrTEC) 10 mg tablet    Lidocaine Viscous HCl (XYLOCAINE) 2 % mucosal solution    fluticasone (FLONASE) 50 mcg/act nasal spray    Throat culture      2  Sore throat  POCT rapid strepA            Plan and Discussion      Symptoms and exam consistent with viral illness  Will treat symptomatically with medications listed above  Rapid strep was negative  Follow up with throat culture    Discussed ED precautions including (but not limited to)  • Difficultly breathing or shortness of breath  • Chest pain  • Acutely worsening symptoms  Risks and benefits discussed  Patient understands and agrees with the plan  Follow up with PCP  Chief Complaint     Chief Complaint   Patient presents with   • Cold Like Symptoms     Pt reports on Thursday she developed congestion, sore throat and b/l ear pain  Taking mucinex  History of Present Illness       Sinusitis  This is a new problem  The current episode started in the past 7 days  There has been no fever  Associated symptoms include congestion, ear pain, headaches and a sore throat  Pertinent negatives include no coughing  Past treatments include oral decongestants  The treatment provided mild relief  Review of Systems   Review of Systems   HENT: Positive for congestion, ear pain and sore throat  Respiratory: Negative for cough  Neurological: Positive for headaches           Current Medications       Current Outpatient Medications:   •  cetirizine (ZyrTEC) 10 mg tablet, Take 1 tablet (10 mg total) by mouth daily for 10 days, Disp: 10 tablet, Rfl: 0  •  fluticasone (FLONASE) 50 mcg/act nasal spray, 1 spray into each nostril 2 (two) times a day, Disp: 11 1 mL, Rfl: 0  •  Lidocaine Viscous HCl (XYLOCAINE) 2 % mucosal solution, Swish and spit 15 mL 4 (four) times a day as needed for mouth pain or discomfort Gargle and spit 15mL 4x a day as needed for sore throat, Disp: 100 mL, Rfl: 0    Current Allergies     Allergies as of 03/05/2023 - Reviewed 03/05/2023   Allergen Reaction Noted   • Amoxicillin Other (See Comments), Fever, and Rash 12/23/2019   • Oxycodone Fever, Vomiting, and Rash 12/23/2019            The following portions of the patient's history were reviewed and updated as appropriate: allergies, current medications, past family history, past medical history, past social history, past surgical history and problem list      History reviewed  No pertinent past medical history  Past Surgical History:   Procedure Laterality Date   • WISDOM TOOTH EXTRACTION         Family History   Problem Relation Age of Onset   • Diabetes Maternal Grandfather    • Seizures Neg Hx          Medications have been verified  Objective   /78   Pulse 88   Temp 98 3 °F (36 8 °C)   Resp 16   Ht 5' 4" (1 626 m)   Wt 85 7 kg (189 lb)   SpO2 99%   BMI 32 44 kg/m²   No LMP recorded  Physical Exam     Physical Exam  Constitutional:       General: She is not in acute distress  Appearance: She is not ill-appearing or toxic-appearing  HENT:      Head: Normocephalic and atraumatic  Right Ear: External ear normal  A middle ear effusion is present  Left Ear: Tympanic membrane and external ear normal       Nose: Congestion present  Comments: Boggy nasal turbinates     Mouth/Throat:      Pharynx: Posterior oropharyngeal erythema present  Comments: Cobblestone appearance in posterior pharynx  Cardiovascular:      Rate and Rhythm: Normal rate and regular rhythm  Pulmonary:      Effort: Pulmonary effort is normal  No respiratory distress  Neurological:      General: No focal deficit present  Mental Status: She is alert and oriented to person, place, and time     Psychiatric:         Mood and Affect: Mood normal          Behavior: Behavior normal          Thought Content:  Thought content normal          Judgment: Judgment normal                Karla Serra DO

## 2023-03-07 LAB — BACTERIA THROAT CULT: NORMAL

## 2023-03-09 ENCOUNTER — TELEMEDICINE (OUTPATIENT)
Dept: FAMILY MEDICINE CLINIC | Facility: CLINIC | Age: 23
End: 2023-03-09

## 2023-03-09 VITALS — BODY MASS INDEX: 30.73 KG/M2 | HEIGHT: 64 IN | WEIGHT: 180 LBS

## 2023-03-09 DIAGNOSIS — J01.00 ACUTE NON-RECURRENT MAXILLARY SINUSITIS: Primary | ICD-10-CM

## 2023-03-09 RX ORDER — DOXYCYCLINE HYCLATE 100 MG/1
100 CAPSULE ORAL EVERY 12 HOURS SCHEDULED
Qty: 14 CAPSULE | Refills: 0 | Status: SHIPPED | OUTPATIENT
Start: 2023-03-09 | End: 2023-03-16

## 2023-03-09 NOTE — PROGRESS NOTES
Virtual Regular Visit    Verification of patient location:    Patient is located in the following state in which I hold an active license [place]      Assessment/Plan:    Problem List Items Addressed This Visit        Respiratory    Acute non-recurrent maxillary sinusitis - Primary    Relevant Medications    doxycycline hyclate (VIBRAMYCIN) 100 mg capsule            Reason for visit is   Chief Complaint   Patient presents with   • Nasal Congestion     And sinus pressure for 1 week    • Earache     Left ear   • Chills   • Virtual Regular Visit        Encounter provider Efrain Lara DO    Provider located at [address] [place] [address] [address]  [address] , [address] [id number] [phone]      Recent Visits  No visits were found meeting these conditions  Showing recent visits within past 7 days and meeting all other requirements  Today's Visits  Date Type Provider Dept   03/09/23 Telemedicine Efrain Lara DO [facility]   Showing today's visits and meeting all other requirements  Future Appointments  No visits were found meeting these conditions  Showing future appointments within next 150 days and meeting all other requirements       The patient was identified by name and date of birth  Jad Peralta was informed that this is a telemedicine visit and that the visit is being conducted through the [employer]  She agrees to proceed     My office door was closed  No one else was in the room  She acknowledged consent and understanding of privacy and security of the video platform  The patient has agreed to participate and understands they can discontinue the visit at any time  Patient is aware this is a billable service  Subjective  Jad Peralta is a 26 y o  female being seen for sick visit   Patient reports sinus pressure, congestion, headache x 1 week  She also has a sore throat  Urgent care visit- strep negative  She is having ear pressure   She is using zyrtec and flonase  HPI     No past medical history on file  Past Surgical History:   Procedure Laterality Date   • WISDOM TOOTH EXTRACTION         Current Outpatient Medications   Medication Sig Dispense Refill   • cetirizine (ZyrTEC) 10 mg tablet Take 1 tablet (10 mg total) by mouth daily for 10 days 10 tablet 0   • doxycycline hyclate (VIBRAMYCIN) 100 mg capsule Take 1 capsule (100 mg total) by mouth every 12 (twelve) hours for 7 days 14 capsule 0   • fluticasone (FLONASE) 50 mcg/act nasal spray 1 spray into each nostril 2 (two) times a day 11 1 mL 0   • Lidocaine Viscous HCl (XYLOCAINE) 2 % mucosal solution Swish and spit 15 mL 4 (four) times a day as needed for mouth pain or discomfort Gargle and spit 15mL 4x a day as needed for sore throat (Patient not taking: Reported on 3/9/2023) 100 mL 0     No current facility-administered medications for this visit  Allergies   Allergen Reactions   • Amoxicillin Other (See Comments), Fever and Rash     Other reaction(s): Nausea and/or vomiting   • Oxycodone Fever, Vomiting and Rash       Review of Systems   Constitutional: Positive for chills  Negative for fatigue and fever  HENT: Positive for congestion, rhinorrhea and sinus pressure  Negative for postnasal drip  Eyes: Negative for photophobia and visual disturbance  Respiratory: Negative for cough and shortness of breath  Cardiovascular: Negative for chest pain, palpitations and leg swelling  Gastrointestinal: Negative for abdominal pain, constipation, diarrhea, nausea and vomiting  Genitourinary: Negative for difficulty urinating and dysuria  Musculoskeletal: Negative for arthralgias and myalgias  Skin: Negative for color change and rash  Neurological: Negative for dizziness, weakness, light-headedness and headaches         Video Exam    Vitals:    03/09/23 0720   Weight: 81 6 kg (180 lb)   Height: 5' 4" (1 626 m)       Physical Exam  Constitutional:       General: She is not in acute distress  Appearance: Normal appearance  She is not ill-appearing, toxic-appearing or diaphoretic  HENT:      Head: Normocephalic and atraumatic  Nose: Nose normal    Eyes:      Extraocular Movements: Extraocular movements intact  Conjunctiva/sclera: Conjunctivae normal    Pulmonary:      Effort: Pulmonary effort is normal       Breath sounds: Normal breath sounds  Musculoskeletal:         General: Normal range of motion  Skin:     General: Skin is warm  Neurological:      Mental Status: She is alert     Psychiatric:         Mood and Affect: Mood normal          Behavior: Behavior normal           I spent 15 minutes directly with the patient during this visit

## 2023-05-08 PROBLEM — J01.00 ACUTE NON-RECURRENT MAXILLARY SINUSITIS: Status: RESOLVED | Noted: 2023-03-09 | Resolved: 2023-05-08

## 2023-11-15 ENCOUNTER — OFFICE VISIT (OUTPATIENT)
Dept: OBGYN CLINIC | Facility: CLINIC | Age: 23
End: 2023-11-15

## 2023-11-15 VITALS
WEIGHT: 190 LBS | SYSTOLIC BLOOD PRESSURE: 120 MMHG | BODY MASS INDEX: 32.44 KG/M2 | HEIGHT: 64 IN | DIASTOLIC BLOOD PRESSURE: 76 MMHG

## 2023-11-15 DIAGNOSIS — Z01.419 ENCOUNTER FOR GYNECOLOGICAL EXAMINATION WITHOUT ABNORMAL FINDING: Primary | ICD-10-CM

## 2023-11-15 DIAGNOSIS — Z11.3 SCREEN FOR STD (SEXUALLY TRANSMITTED DISEASE): ICD-10-CM

## 2023-11-15 DIAGNOSIS — Z12.4 ENCOUNTER FOR PAPANICOLAOU SMEAR FOR CERVICAL CANCER SCREENING: ICD-10-CM

## 2023-11-15 PROCEDURE — G0145 SCR C/V CYTO,THINLAYER,RESCR: HCPCS | Performed by: NURSE PRACTITIONER

## 2023-11-15 PROCEDURE — 87591 N.GONORRHOEAE DNA AMP PROB: CPT | Performed by: NURSE PRACTITIONER

## 2023-11-15 PROCEDURE — 87491 CHLMYD TRACH DNA AMP PROBE: CPT | Performed by: NURSE PRACTITIONER

## 2023-11-15 NOTE — PATIENT INSTRUCTIONS
Pap every 3 years if normal, STI testing as indicated, exercise most days of week, obtain appropriate diet and hydration, Calcium 1000mg + 600 vit D daily,.   Annual mammogram starting at age 36, monthly breast self exam.

## 2023-11-15 NOTE — PROGRESS NOTES
Assessment/Plan:    Pap every 3 years if normal, STI testing as indicated, exercise most days of week, obtain appropriate diet and hydration, Calcium 1000mg + 600 vit D daily,. Annual mammogram starting at age 36, monthly breast self exam.      Diagnoses and all orders for this visit:    Encounter for gynecological examination without abnormal finding  -     Liquid-based pap, screening  -     Chlamydia/GC amplified DNA by PCR    Encounter for Papanicolaou smear for cervical cancer screening  -     Liquid-based pap, screening    Screen for STD (sexually transmitted disease)  -     Chlamydia/GC amplified DNA by PCR          Subjective:      Patient ID: Brooklyn Holly is a 26 y.o. female. New pt here for annual gyn Menarche 15 Off BC x 2  years due to elevated BP Withdrawal for bc Not interested in other options Getting  in 6 months would be OK with preg Periods monthly every 4-5 weeks bleeds about a week. Cramps the week prior No other pain NO unusual discharge Never screened for STD Did receive gardisil        The following portions of the patient's history were reviewed and updated as appropriate: allergies, current medications, past family history, past medical history, past social history, past surgical history, and problem list.    Review of Systems   Constitutional:  Negative for fatigue and unexpected weight change. Gastrointestinal:  Negative for abdominal distention, abdominal pain, constipation and diarrhea. Genitourinary:  Negative for difficulty urinating, dyspareunia, dysuria, frequency, genital sores, menstrual problem, pelvic pain, urgency, vaginal bleeding, vaginal discharge and vaginal pain. Neurological:  Negative for headaches. Psychiatric/Behavioral: Negative. Negative for dysphoric mood. The patient is not nervous/anxious.           Objective:      /76 (BP Location: Left arm, Patient Position: Sitting, Cuff Size: Standard)   Ht 5' 4" (1.626 m)   Wt 86.2 kg (190 lb) LMP 10/29/2023   BMI 32.61 kg/m²          Physical Exam  Vitals and nursing note reviewed. Constitutional:       General: She is not in acute distress. Appearance: Normal appearance. HENT:      Head: Normocephalic and atraumatic. Pulmonary:      Effort: Pulmonary effort is normal.   Chest:   Breasts:     Breasts are symmetrical.      Right: Normal. No mass, nipple discharge, skin change or tenderness. Left: Normal. No mass, nipple discharge, skin change or tenderness. Abdominal:      General: There is no distension. Palpations: Abdomen is soft. Tenderness: There is no abdominal tenderness. There is no guarding or rebound. Genitourinary:     General: Normal vulva. Exam position: Lithotomy position. Labia:         Right: No rash, tenderness, lesion or injury. Left: No rash, tenderness, lesion or injury. Urethra: No prolapse, urethral pain, urethral swelling or urethral lesion. Vagina: Normal. No erythema or lesions. Cervix: No cervical motion tenderness, discharge, lesion or cervical bleeding. Uterus: Normal.       Adnexa: Right adnexa normal and left adnexa normal.        Right: No mass or tenderness. Left: No mass or tenderness. Rectum: No mass or external hemorrhoid. Comments: PAP from cervix  Musculoskeletal:         General: Normal range of motion. Lymphadenopathy:      Upper Body:      Right upper body: No axillary adenopathy. Left upper body: No axillary adenopathy. Lower Body: No right inguinal adenopathy. No left inguinal adenopathy. Skin:     General: Skin is warm and dry. Neurological:      Mental Status: She is alert and oriented to person, place, and time. Psychiatric:         Mood and Affect: Mood normal.         Behavior: Behavior normal.         Thought Content:  Thought content normal.         Judgment: Judgment normal.

## 2023-11-17 LAB
C TRACH DNA SPEC QL NAA+PROBE: NEGATIVE
N GONORRHOEA DNA SPEC QL NAA+PROBE: NEGATIVE

## 2023-11-22 ENCOUNTER — OFFICE VISIT (OUTPATIENT)
Dept: URGENT CARE | Facility: CLINIC | Age: 23
End: 2023-11-22
Payer: COMMERCIAL

## 2023-11-22 VITALS
SYSTOLIC BLOOD PRESSURE: 122 MMHG | RESPIRATION RATE: 16 BRPM | HEART RATE: 80 BPM | HEIGHT: 64 IN | OXYGEN SATURATION: 100 % | DIASTOLIC BLOOD PRESSURE: 62 MMHG | BODY MASS INDEX: 31.58 KG/M2 | WEIGHT: 185 LBS | TEMPERATURE: 99.2 F

## 2023-11-22 DIAGNOSIS — J01.90 ACUTE NON-RECURRENT SINUSITIS, UNSPECIFIED LOCATION: Primary | ICD-10-CM

## 2023-11-22 PROCEDURE — 99213 OFFICE O/P EST LOW 20 MIN: CPT | Performed by: PHYSICIAN ASSISTANT

## 2023-11-22 RX ORDER — AZITHROMYCIN 250 MG/1
TABLET, FILM COATED ORAL
Qty: 6 TABLET | Refills: 0 | Status: SHIPPED | OUTPATIENT
Start: 2023-11-22 | End: 2023-11-26

## 2023-11-22 NOTE — PROGRESS NOTES
[facility]        NAME: Kathya Worley is a 26 y.o. female  : 2000    MRN: [id number]  DATE: 2023  TIME: 8:36 AM    Assessment and Plan   Acute non-recurrent sinusitis, unspecified location [J01.90]  1. Acute non-recurrent sinusitis, unspecified location  azithromycin (ZITHROMAX) 250 mg tablet          Declined Flu and COVID testing  Patient Instructions   Patient was educated on sinus infection. Patient was educated on antibiotics. Patient was told to eat on antibiotics. Any chest pain or shortness of breath go to ED    Chief Complaint     Chief Complaint   Patient presents with    Cold Like Symptoms     Pt reports yesterday she developed congestion, post nasal drip, sore throat, cough, b/l ear discomfort/feels clogged. Denies fevers. Taking mucinex. History of Present Illness       Patient  is here today reporting cough, PND, ear pain and sore throat for 1 day. Admits allergy to Amoxicillin and oxycodone. Denies any history of asthma or diabetes. Review of Systems   Review of Systems   Constitutional: Negative. HENT:  Positive for congestion, postnasal drip, sinus pressure, sinus pain and sore throat. Respiratory:  Positive for cough. Cardiovascular: Negative. Psychiatric/Behavioral: Negative.            Current Medications       Current Outpatient Medications:     azithromycin (ZITHROMAX) 250 mg tablet, Take 2 tablets today then 1 tablet daily x 4 days, Disp: 6 tablet, Rfl: 0    cetirizine (ZyrTEC) 10 mg tablet, Take 1 tablet (10 mg total) by mouth daily for 10 days, Disp: 10 tablet, Rfl: 0    fluticasone (FLONASE) 50 mcg/act nasal spray, 1 spray into each nostril 2 (two) times a day, Disp: 11.1 mL, Rfl: 0    Lidocaine Viscous HCl (XYLOCAINE) 2 % mucosal solution, Swish and spit 15 mL 4 (four) times a day as needed for mouth pain or discomfort Gargle and spit 15mL 4x a day as needed for sore throat (Patient not taking: Reported on 3/9/2023), Disp: 100 mL, Rfl: 0    Current Allergies     Allergies as of 11/22/2023 - Reviewed 11/22/2023   Allergen Reaction Noted    Amoxicillin Other (See Comments), Fever, and Rash 12/23/2019    Oxycodone Fever, Vomiting, and Rash 12/23/2019    Gluten meal - food allergy GI Intolerance 11/22/2023            The following portions of the patient's history were reviewed and updated as appropriate: allergies, current medications, past family history, past medical history, past social history, past surgical history and problem list.     History reviewed. No pertinent past medical history. Past Surgical History:   Procedure Laterality Date    WISDOM TOOTH EXTRACTION         Family History   Problem Relation Age of Onset    Diabetes Maternal Grandfather     Seizures Neg Hx          Medications have been verified. Objective   /62   Pulse 80   Temp 99.2 °F (37.3 °C)   Resp 16   Ht 5' 4" (1.626 m)   Wt 83.9 kg (185 lb)   LMP 10/29/2023   SpO2 100%   BMI 31.76 kg/m²   Patient's last menstrual period was 10/29/2023. Physical Exam     Physical Exam  Vitals and nursing note reviewed. Constitutional:       Appearance: Normal appearance. HENT:      Head: Normocephalic. Comments: Pressure over frontal or maxillary sinus     Right Ear: Tympanic membrane, ear canal and external ear normal.      Left Ear: Tympanic membrane, ear canal and external ear normal.      Mouth/Throat:      Mouth: Mucous membranes are moist.   Eyes:      Extraocular Movements: Extraocular movements intact. Pupils: Pupils are equal, round, and reactive to light. Cardiovascular:      Rate and Rhythm: Normal rate and regular rhythm. Heart sounds: Normal heart sounds. Pulmonary:      Breath sounds: Normal breath sounds. No wheezing. Neurological:      General: No focal deficit present. Mental Status: She is alert and oriented to person, place, and time.    Psychiatric:         Mood and Affect: Mood normal. Behavior: Behavior normal.

## 2023-11-22 NOTE — PATIENT INSTRUCTIONS
Patient was educated on sinus infection. Patient was educated on antibiotics. Patient was told to eat on antibiotics. Any chest pain or shortness of breath go to ED    Sinusitis   WHAT YOU NEED TO KNOW:   Sinusitis is inflammation or infection of your sinuses. Sinusitis is most often caused by a virus. Acute sinusitis may last up to 12 weeks. Chronic sinusitis lasts longer than 12 weeks. Recurrent sinusitis means you have 4 or more infections in 1 year. DISCHARGE INSTRUCTIONS:   Return to the emergency department if:   You have trouble breathing or wheezing that is getting worse. You have a stiff neck, a fever, or a bad headache. You cannot open your eye. Your eyeball bulges out or you cannot move your eye. You are more sleepy than normal, or you notice changes in your ability to think, move, or talk. You have swelling of your forehead or scalp. Call your doctor if:   You have vision changes, such as double vision. Your eye and eyelid are red, swollen, and painful. Your symptoms do not improve or go away after 10 days. You have nausea and are vomiting. Your nose is bleeding. You have questions or concerns about your condition or care. Medicines: Your symptoms may go away on their own. Your healthcare provider may recommend watchful waiting for up to 10 days before starting antibiotics. You may need any of the following:  Acetaminophen  decreases pain and fever. It is available without a doctor's order. Ask how much to take and how often to take it. Follow directions. Read the labels of all other medicines you are using to see if they also contain acetaminophen, or ask your doctor or pharmacist. Acetaminophen can cause liver damage if not taken correctly. NSAIDs , such as ibuprofen, help decrease swelling, pain, and fever. This medicine is available with or without a doctor's order. NSAIDs can cause stomach bleeding or kidney problems in certain people.  If you take blood thinner medicine, always ask your healthcare provider if NSAIDs are safe for you. Always read the medicine label and follow directions. Nasal steroid sprays  may help decrease inflammation in your nose and sinuses. Decongestants  help reduce swelling and drain mucus in the nose and sinuses. They may help you breathe easier. Antihistamines  help dry mucus in the nose and relieve sneezing. Antibiotics  help treat or prevent a bacterial infection. Take your medicine as directed. Contact your healthcare provider if you think your medicine is not helping or if you have side effects. Tell your provider if you are allergic to any medicine. Keep a list of the medicines, vitamins, and herbs you take. Include the amounts, and when and why you take them. Bring the list or the pill bottles to follow-up visits. Carry your medicine list with you in case of an emergency. Self-care:   Rinse your sinuses as directed. Use a sinus rinse device to rinse your nasal passages with a saline (salt water) solution or distilled water. Do not use tap water. This will help thin the mucus in your nose and rinse away pollen and dirt. It will also help reduce swelling so you can breathe normally. Use a humidifier  to increase air moisture in your home. This may make it easier for you to breathe and help decrease your cough. Sleep with your head elevated. Place an extra pillow under your head before you go to sleep to help your sinuses drain. Drink liquids as directed. Ask your healthcare provider how much liquid to drink each day and which liquids are best for you. Liquids will thin the mucus in your nose and help it drain. Avoid drinks that contain alcohol or caffeine. Do not smoke, and avoid secondhand smoke. Nicotine and other chemicals in cigarettes and cigars can make your symptoms worse. Ask your healthcare provider for information if you currently smoke and need help to quit.  E-cigarettes or smokeless tobacco still contain nicotine. Talk to your healthcare provider before you use these products. Prevent the spread of germs:   Wash your hands often with soap and water. Wash your hands after you use the bathroom, change a child's diaper, or sneeze. Wash your hands before you prepare or eat food. Stay away from people who are sick. Some germs spread easily and quickly through contact. Follow up with your doctor as directed: You may be referred to an ear, nose, and throat specialist. Write down your questions so you remember to ask them during your visits. © Copyright Eladia Richter 2023 Information is for End User's use only and may not be sold, redistributed or otherwise used for commercial purposes. The above information is an  only. It is not intended as medical advice for individual conditions or treatments. Talk to your doctor, nurse or pharmacist before following any medical regimen to see if it is safe and effective for you.

## 2023-11-24 LAB
LAB AP GYN PRIMARY INTERPRETATION: NORMAL
Lab: NORMAL

## 2023-12-07 ENCOUNTER — OFFICE VISIT (OUTPATIENT)
Dept: FAMILY MEDICINE CLINIC | Facility: CLINIC | Age: 23
End: 2023-12-07
Payer: COMMERCIAL

## 2023-12-07 VITALS
TEMPERATURE: 97.6 F | BODY MASS INDEX: 32.47 KG/M2 | SYSTOLIC BLOOD PRESSURE: 118 MMHG | OXYGEN SATURATION: 98 % | HEART RATE: 78 BPM | RESPIRATION RATE: 16 BRPM | DIASTOLIC BLOOD PRESSURE: 86 MMHG | HEIGHT: 64 IN | WEIGHT: 190.2 LBS

## 2023-12-07 DIAGNOSIS — Z11.4 SCREENING FOR HIV (HUMAN IMMUNODEFICIENCY VIRUS): ICD-10-CM

## 2023-12-07 DIAGNOSIS — Z00.00 ANNUAL PHYSICAL EXAM: Primary | ICD-10-CM

## 2023-12-07 DIAGNOSIS — Z11.59 NEED FOR HEPATITIS C SCREENING TEST: ICD-10-CM

## 2023-12-07 DIAGNOSIS — Z13.6 SCREENING FOR CARDIOVASCULAR CONDITION: ICD-10-CM

## 2023-12-07 DIAGNOSIS — Z23 ENCOUNTER FOR IMMUNIZATION: ICD-10-CM

## 2023-12-07 DIAGNOSIS — Z13.1 SCREENING FOR DIABETES MELLITUS: ICD-10-CM

## 2023-12-07 DIAGNOSIS — R14.0 ABDOMINAL BLOATING: ICD-10-CM

## 2023-12-07 DIAGNOSIS — E66.9 OBESITY (BMI 30.0-34.9): ICD-10-CM

## 2023-12-07 PROCEDURE — 90471 IMMUNIZATION ADMIN: CPT

## 2023-12-07 PROCEDURE — 90715 TDAP VACCINE 7 YRS/> IM: CPT

## 2023-12-07 PROCEDURE — 99395 PREV VISIT EST AGE 18-39: CPT | Performed by: FAMILY MEDICINE

## 2023-12-07 NOTE — PATIENT INSTRUCTIONS
Wellness Visit for Adults   AMBULATORY CARE:   A wellness visit  is when you see your healthcare provider to get screened for health problems. Your healthcare provider will also give you advice on how to stay healthy. Write down your questions so you remember to ask them. Ask your healthcare provider how often you should have a wellness visit. What happens at a wellness visit:  Your healthcare provider will ask about your health, and your family history of health problems. This includes high blood pressure, heart disease, and cancer. He or she will ask if you have symptoms that concern you, if you smoke, and about your mood. You may also be asked about your intake of medicines, supplements, food, and alcohol. Any of the following may be done: Your weight  will be checked. Your height may also be checked so your body mass index (BMI) can be calculated. Your BMI shows if you are at a healthy weight. Your blood pressure  and heart rate will be checked. Your temperature may also be checked. Blood and urine tests  may be done. Blood tests may be done to check your cholesterol levels. Abnormal cholesterol levels increase your risk for heart disease and stroke. You may also need a blood or urine test to check for diabetes if you are at increased risk. Urine tests may be done to look for signs of an infection or kidney disease. A physical exam  includes checking your heartbeat and lungs with a stethoscope. Your healthcare provider may also check your skin to look for sun damage. Screening tests  may be recommended. A screening test is done to check for diseases that may not cause symptoms. The screening tests you may need depend on your age, gender, family history, and lifestyle habits. For example, colorectal screening may be recommended if you are 48years old or older. Screening tests you need if you are a woman:   A Pap smear  is used to screen for cervical cancer.  Pap smears are usually done every 3 to 5 years depending on your age. You may need them more often if you have had abnormal Pap smear test results in the past. Ask your healthcare provider how often you should have a Pap smear. A mammogram  is an x-ray of your breasts to screen for breast cancer. Experts recommend mammograms every 2 years starting at age 48 years. You may need a mammogram at age 52 years or younger if you have an increased risk for breast cancer. Talk to your healthcare provider about when you should start having mammograms and how often you need them. Vaccines you may need:   Get an influenza vaccine  every year. The influenza vaccine protects you from the flu. Several types of viruses cause the flu. The viruses change over time, so new vaccines are made each year. Get a tetanus-diphtheria (Td) booster vaccine  every 10 years. This vaccine protects you against tetanus and diphtheria. Tetanus is a severe infection that may cause painful muscle spasms and lockjaw. Diphtheria is a severe bacterial infection that causes a thick covering in the back of your mouth and throat. Get a human papillomavirus (HPV) vaccine  if you are female and aged 23 to 32 or male 23 to 24 and never received it. This vaccine protects you from HPV infection. HPV is the most common infection spread by sexual contact. HPV may also cause vaginal, penile, and anal cancers. Get a pneumococcal vaccine  if you are aged 72 years or older. The pneumococcal vaccine is an injection given to protect you from pneumococcal disease. Pneumococcal disease is an infection caused by pneumococcal bacteria. The infection may cause pneumonia, meningitis, or an ear infection. Get a shingles vaccine  if you are 60 or older, even if you have had shingles before. The shingles vaccine is an injection to protect you from the varicella-zoster virus. This is the same virus that causes chickenpox.  Shingles is a painful rash that develops in people who had chickenpox or have been exposed to the virus. How to eat healthy:  My Plate is a model for planning healthy meals. It shows the types and amounts of foods that should go on your plate. Fruits and vegetables make up about half of your plate, and grains and protein make up the other half. A serving of dairy is included on the side of your plate. The amount of calories and serving sizes you need depends on your age, gender, weight, and height. Examples of healthy foods are listed below:  Eat a variety of vegetables  such as dark green, red, and orange vegetables. You can also include canned vegetables low in sodium (salt) and frozen vegetables without added butter or sauces. Eat a variety of fresh fruits , canned fruit in 100% juice, frozen fruit, and dried fruit. Include whole grains. At least half of the grains you eat should be whole grains. Examples include whole-wheat bread, wheat pasta, brown rice, and whole-grain cereals such as oatmeal.    Eat a variety of protein foods such as seafood (fish and shellfish), lean meat, and poultry without skin (turkey and chicken). Examples of lean meats include pork leg, shoulder, or tenderloin, and beef round, sirloin, tenderloin, and extra lean ground beef. Other protein foods include eggs and egg substitutes, beans, peas, soy products, nuts, and seeds. Choose low-fat dairy products such as skim or 1% milk or low-fat yogurt, cheese, and cottage cheese. Limit unhealthy fats  such as butter, hard margarine, and shortening. Exercise:  Exercise at least 30 minutes per day on most days of the week. Some examples of exercise include walking, biking, dancing, and swimming. You can also fit in more physical activity by taking the stairs instead of the elevator or parking farther away from stores. Include muscle strengthening activities 2 days each week. Regular exercise provides many health benefits.  It helps you manage your weight, and decreases your risk for type 2 diabetes, heart disease, stroke, and high blood pressure. Exercise can also help improve your mood. Ask your healthcare provider about the best exercise plan for you. General health and safety guidelines:   Do not smoke. Nicotine and other chemicals in cigarettes and cigars can cause lung damage. Ask your healthcare provider for information if you currently smoke and need help to quit. E-cigarettes or smokeless tobacco still contain nicotine. Talk to your healthcare provider before you use these products. Limit alcohol. A drink of alcohol is 12 ounces of beer, 5 ounces of wine, or 1½ ounces of liquor. Lose weight, if needed. Being overweight increases your risk of certain health conditions. These include heart disease, high blood pressure, type 2 diabetes, and certain types of cancer. Protect your skin. Do not sunbathe or use tanning beds. Use sunscreen with a SPF 15 or higher. Apply sunscreen at least 15 minutes before you go outside. Reapply sunscreen every 2 hours. Wear protective clothing, hats, and sunglasses when you are outside. Drive safely. Always wear your seatbelt. Make sure everyone in your car wears a seatbelt. A seatbelt can save your life if you are in an accident. Do not use your cell phone when you are driving. This could distract you and cause an accident. Pull over if you need to make a call or send a text message. Practice safe sex. Use latex condoms if are sexually active and have more than one partner. Your healthcare provider may recommend screening tests for sexually transmitted infections (STIs). Wear helmets, lifejackets, and protective gear. Always wear a helmet when you ride a bike or motorcycle, go skiing, or play sports that could cause a head injury. Wear protective equipment when you play sports. Wear a lifejacket when you are on a boat or doing water sports.     © Copyright Sharifa Frank 2023 Information is for End User's use only and may not be sold, redistributed or otherwise used for commercial purposes. The above information is an  only. It is not intended as medical advice for individual conditions or treatments. Talk to your doctor, nurse or pharmacist before following any medical regimen to see if it is safe and effective for you.

## 2023-12-07 NOTE — ASSESSMENT & PLAN NOTE
Patient tried gluten free and had improvement in her symptoms  Will check labs and refer to GI for additional evaluation

## 2023-12-07 NOTE — ASSESSMENT & PLAN NOTE
BMI Counseling: Body mass index is 32.65 kg/m². The BMI is above normal. Nutrition recommendations include reducing portion sizes, decreasing overall calorie intake, and 3-5 servings of fruits/vegetables daily. Exercise recommendations include exercising 3-5 times per week.

## 2023-12-07 NOTE — PROGRESS NOTES
[id number] [facility]    NAME: Whitney Quarles  AGE: 26 y.o. SEX: female  : 2000     DATE: 2023     Assessment and Plan:     Problem List Items Addressed This Visit          Other    Abdominal bloating     Patient tried gluten free and had improvement in her symptoms  Will check labs and refer to GI for additional evaluation          Relevant Orders    Lipid panel    Comprehensive metabolic panel    CBC and differential    TSH, 3rd generation with Free T4 reflex    UA (URINE) with reflex to Scope    Celiac Disease Antibody Profile    Ambulatory Referral to Gastroenterology    Obesity (BMI 30.0-34. 9)     BMI Counseling: Body mass index is 32.65 kg/m². The BMI is above normal. Nutrition recommendations include reducing portion sizes, decreasing overall calorie intake, and 3-5 servings of fruits/vegetables daily. Exercise recommendations include exercising 3-5 times per week.           Relevant Orders    Lipid panel    Comprehensive metabolic panel    CBC and differential    TSH, 3rd generation with Free T4 reflex    UA (URINE) with reflex to Scope    Celiac Disease Antibody Profile     Other Visit Diagnoses       Annual physical exam    -  Primary    Relevant Orders    Lipid panel    Comprehensive metabolic panel    CBC and differential    TSH, 3rd generation with Free T4 reflex    UA (URINE) with reflex to Scope    HIV 1/2 AB/AG w Reflex [facility] for 2 yr old and above    Hepatitis C antibody    Celiac Disease Antibody Profile    Encounter for immunization        Relevant Orders    TDAP VACCINE GREATER THAN OR EQUAL TO 6YO IM (Completed)    Screening for diabetes mellitus        Relevant Orders    Lipid panel    Comprehensive metabolic panel    Screening for cardiovascular condition        Relevant Orders    Lipid panel    Comprehensive metabolic panel    Screening for HIV (human immunodeficiency virus)        Relevant Orders HIV 1/2 AB/AG w Reflex [facility] for 2 yr old and above    Need for hepatitis C screening test        Relevant Orders    Hepatitis C antibody            Immunizations and preventive care screenings were discussed with patient today. Appropriate education was printed on patient's after visit summary. Counseling:  Alcohol/drug use: discussed moderation in alcohol intake, the recommendations for healthy alcohol use, and avoidance of illicit drug use. Dental Health: discussed importance of regular tooth brushing, flossing, and dental visits. Injury prevention: discussed safety/seat belts, safety helmets, smoke detectors, carbon dioxide detectors, and smoking near bedding or upholstery. Sexual health: discussed sexually transmitted diseases, partner selection, use of condoms, avoidance of unintended pregnancy, and contraceptive alternatives. Exercise: the importance of regular exercise/physical activity was discussed. Recommend exercise 3-5 times per week for at least 30 minutes. Return in about 1 year (around 12/7/2024). Chief Complaint:     Chief Complaint   Patient presents with    Annual Exam     Pt reports digestive issues, recently stopped eating gluten which seemed to stop problems. Has eaten it mistakenly which has cause pt to feel sick. Would like to discuss testing. History of Present Illness:     Adult Annual Physical   Patient here for a comprehensive physical exam. The patient reports no problems. Diet and Physical Activity  Diet/Nutrition: well balanced diet. Exercise: moderate cardiovascular exercise. Depression Screening  PHQ-2/9 Depression Screening             /GYN Health  Follows with gyn       Review of Systems:     Review of Systems   Constitutional:  Negative for chills, fatigue and fever. HENT:  Negative for congestion, postnasal drip, rhinorrhea and sinus pressure. Eyes:  Negative for photophobia and visual disturbance.    Respiratory:  Negative for cough and shortness of breath. Cardiovascular:  Negative for chest pain, palpitations and leg swelling. Gastrointestinal:  Positive for abdominal pain. Negative for constipation, diarrhea, nausea and vomiting. Genitourinary:  Negative for difficulty urinating and dysuria. Musculoskeletal:  Negative for arthralgias and myalgias. Skin:  Negative for color change and rash. Neurological:  Negative for dizziness, weakness, light-headedness and headaches. Past Medical History:     History reviewed. No pertinent past medical history. Past Surgical History:     Past Surgical History:   Procedure Laterality Date    WISDOM TOOTH EXTRACTION        Social History:     Social History     Socioeconomic History    Marital status: Single     Spouse name: None    Number of children: None    Years of education: None    Highest education level: None   Occupational History    None   Tobacco Use    Smoking status: Never    Smokeless tobacco: Never   Vaping Use    Vaping Use: Never used   Substance and Sexual Activity    Alcohol use: Yes     Comment: socially    Drug use: Never    Sexual activity: Yes     Partners: Male     Birth control/protection: Coitus interruptus, None   Other Topics Concern    None   Social History Narrative    Lives in [place], [place] with parents.       Social Determinants of Health     Financial Resource Strain: Not on file   Food Insecurity: Not on file   Transportation Needs: Not on file   Physical Activity: Not on file   Stress: Not on file   Social Connections: Not on file   Intimate Partner Violence: Not At Risk (7/5/2022)    Humiliation, Afraid, Rape, and Kick questionnaire     Fear of Current or Ex-Partner: No     Emotionally Abused: No     Physically Abused: No     Sexually Abused: No   Housing Stability: Not on file      Family History:     Family History   Problem Relation Age of Onset    Diabetes Maternal Grandfather     Seizures Neg Hx       Current Medications:     No current outpatient medications on file. No current facility-administered medications for this visit. Allergies: Allergies   Allergen Reactions    Amoxicillin Other (See Comments), Fever and Rash     Other reaction(s): Nausea and/or vomiting    Oxycodone Fever, Vomiting and Rash    Gluten Meal - Food Allergy GI Intolerance      Physical Exam:     /86 (BP Location: Right arm, Patient Position: Sitting, Cuff Size: Adult)   Pulse 78   Temp 97.6 °F (36.4 °C) (Tympanic)   Resp 16   Ht 5' 4" (1.626 m)   Wt 86.3 kg (190 lb 3.2 oz)   LMP 12/05/2023 (Exact Date)   SpO2 98%   BMI 32.65 kg/m²     Physical Exam  Constitutional:       General: She is not in acute distress. Appearance: Normal appearance. She is not ill-appearing, toxic-appearing or diaphoretic. HENT:      Head: Normocephalic and atraumatic. Right Ear: Tympanic membrane and ear canal normal.      Left Ear: Tympanic membrane and ear canal normal.      Nose: Nose normal. No congestion. Mouth/Throat:      Mouth: Mucous membranes are moist.      Pharynx: Oropharynx is clear. No oropharyngeal exudate. Eyes:      Extraocular Movements: Extraocular movements intact. Conjunctiva/sclera: Conjunctivae normal.      Pupils: Pupils are equal, round, and reactive to light. Cardiovascular:      Rate and Rhythm: Normal rate and regular rhythm. Pulses: Normal pulses. Heart sounds: No murmur heard. Pulmonary:      Effort: Pulmonary effort is normal.      Breath sounds: Normal breath sounds. No wheezing, rhonchi or rales. Abdominal:      General: Bowel sounds are normal. There is no distension. Palpations: Abdomen is soft. Tenderness: There is no abdominal tenderness. Musculoskeletal:         General: No swelling or tenderness. Normal range of motion. Cervical back: Normal range of motion and neck supple. Skin:     General: Skin is warm and dry. Capillary Refill: Capillary refill takes less than 2 seconds. Neurological:      General: No focal deficit present. Mental Status: She is alert and oriented to person, place, and time. Cranial Nerves: No cranial nerve deficit. Psychiatric:         Mood and Affect: Mood normal.         Behavior: Behavior normal.         Thought Content:  Thought content normal.          Danielle Hdz, DO   [facility]

## 2023-12-13 ENCOUNTER — APPOINTMENT (OUTPATIENT)
Dept: LAB | Facility: CLINIC | Age: 23
End: 2023-12-13
Payer: COMMERCIAL

## 2023-12-13 ENCOUNTER — OFFICE VISIT (OUTPATIENT)
Dept: GASTROENTEROLOGY | Facility: CLINIC | Age: 23
End: 2023-12-13
Payer: COMMERCIAL

## 2023-12-13 ENCOUNTER — TELEPHONE (OUTPATIENT)
Dept: GASTROENTEROLOGY | Facility: CLINIC | Age: 23
End: 2023-12-13

## 2023-12-13 VITALS
HEIGHT: 64 IN | DIASTOLIC BLOOD PRESSURE: 60 MMHG | BODY MASS INDEX: 33.12 KG/M2 | HEART RATE: 80 BPM | SYSTOLIC BLOOD PRESSURE: 102 MMHG | WEIGHT: 194 LBS

## 2023-12-13 DIAGNOSIS — Z11.59 NEED FOR HEPATITIS C SCREENING TEST: ICD-10-CM

## 2023-12-13 DIAGNOSIS — R19.7 DIARRHEA, UNSPECIFIED TYPE: ICD-10-CM

## 2023-12-13 DIAGNOSIS — Z13.1 SCREENING FOR DIABETES MELLITUS: ICD-10-CM

## 2023-12-13 DIAGNOSIS — Z00.00 ANNUAL PHYSICAL EXAM: ICD-10-CM

## 2023-12-13 DIAGNOSIS — R11.0 NAUSEA: ICD-10-CM

## 2023-12-13 DIAGNOSIS — Z13.6 SCREENING FOR CARDIOVASCULAR CONDITION: ICD-10-CM

## 2023-12-13 DIAGNOSIS — Z11.4 SCREENING FOR HIV (HUMAN IMMUNODEFICIENCY VIRUS): ICD-10-CM

## 2023-12-13 DIAGNOSIS — R14.0 ABDOMINAL BLOATING: Primary | ICD-10-CM

## 2023-12-13 DIAGNOSIS — E66.9 OBESITY (BMI 30.0-34.9): ICD-10-CM

## 2023-12-13 DIAGNOSIS — R14.0 ABDOMINAL BLOATING: ICD-10-CM

## 2023-12-13 LAB
ALBUMIN SERPL BCP-MCNC: 4.6 G/DL (ref 3.5–5)
ALP SERPL-CCNC: 66 U/L (ref 34–104)
ALT SERPL W P-5'-P-CCNC: 15 U/L (ref 7–52)
ANION GAP SERPL CALCULATED.3IONS-SCNC: 7 MMOL/L
AST SERPL W P-5'-P-CCNC: 20 U/L (ref 13–39)
BASOPHILS # BLD AUTO: 0.02 THOUSANDS/ÂΜL (ref 0–0.1)
BASOPHILS NFR BLD AUTO: 0 % (ref 0–1)
BILIRUB SERPL-MCNC: 0.64 MG/DL (ref 0.2–1)
BILIRUB UR QL STRIP: NEGATIVE
BUN SERPL-MCNC: 12 MG/DL (ref 5–25)
CALCIUM SERPL-MCNC: 9.6 MG/DL (ref 8.4–10.2)
CHLORIDE SERPL-SCNC: 101 MMOL/L (ref 96–108)
CHOLEST SERPL-MCNC: 222 MG/DL
CLARITY UR: CLEAR
CO2 SERPL-SCNC: 28 MMOL/L (ref 21–32)
COLOR UR: COLORLESS
CREAT SERPL-MCNC: 0.75 MG/DL (ref 0.6–1.3)
EOSINOPHIL # BLD AUTO: 0.12 THOUSAND/ÂΜL (ref 0–0.61)
EOSINOPHIL NFR BLD AUTO: 2 % (ref 0–6)
ERYTHROCYTE [DISTWIDTH] IN BLOOD BY AUTOMATED COUNT: 11.8 % (ref 11.6–15.1)
GFR SERPL CREATININE-BSD FRML MDRD: 114 ML/MIN/1.73SQ M
GLUCOSE P FAST SERPL-MCNC: 86 MG/DL (ref 65–99)
GLUCOSE UR STRIP-MCNC: NEGATIVE MG/DL
HCT VFR BLD AUTO: 42.3 % (ref 34.8–46.1)
HCV AB SER QL: NORMAL
HDLC SERPL-MCNC: 77 MG/DL
HGB BLD-MCNC: 14.1 G/DL (ref 11.5–15.4)
HGB UR QL STRIP.AUTO: NEGATIVE
HIV 1+2 AB+HIV1 P24 AG SERPL QL IA: NORMAL
HIV 2 AB SERPL QL IA: NORMAL
HIV1 AB SERPL QL IA: NORMAL
HIV1 P24 AG SERPL QL IA: NORMAL
IMM GRANULOCYTES # BLD AUTO: 0.02 THOUSAND/UL (ref 0–0.2)
IMM GRANULOCYTES NFR BLD AUTO: 0 % (ref 0–2)
KETONES UR STRIP-MCNC: NEGATIVE MG/DL
LDLC SERPL CALC-MCNC: 131 MG/DL (ref 0–100)
LEUKOCYTE ESTERASE UR QL STRIP: NEGATIVE
LYMPHOCYTES # BLD AUTO: 2.2 THOUSANDS/ÂΜL (ref 0.6–4.47)
LYMPHOCYTES NFR BLD AUTO: 38 % (ref 14–44)
MCH RBC QN AUTO: 30.7 PG (ref 26.8–34.3)
MCHC RBC AUTO-ENTMCNC: 33.3 G/DL (ref 31.4–37.4)
MCV RBC AUTO: 92 FL (ref 82–98)
MONOCYTES # BLD AUTO: 0.52 THOUSAND/ÂΜL (ref 0.17–1.22)
MONOCYTES NFR BLD AUTO: 9 % (ref 4–12)
NEUTROPHILS # BLD AUTO: 2.92 THOUSANDS/ÂΜL (ref 1.85–7.62)
NEUTS SEG NFR BLD AUTO: 51 % (ref 43–75)
NITRITE UR QL STRIP: NEGATIVE
NONHDLC SERPL-MCNC: 145 MG/DL
NRBC BLD AUTO-RTO: 0 /100 WBCS
PH UR STRIP.AUTO: 7 [PH]
PLATELET # BLD AUTO: 329 THOUSANDS/UL (ref 149–390)
PMV BLD AUTO: 10.3 FL (ref 8.9–12.7)
POTASSIUM SERPL-SCNC: 3.7 MMOL/L (ref 3.5–5.3)
PROT SERPL-MCNC: 7.7 G/DL (ref 6.4–8.4)
PROT UR STRIP-MCNC: NEGATIVE MG/DL
RBC # BLD AUTO: 4.6 MILLION/UL (ref 3.81–5.12)
SODIUM SERPL-SCNC: 136 MMOL/L (ref 135–147)
SP GR UR STRIP.AUTO: 1 (ref 1–1.03)
TRIGL SERPL-MCNC: 72 MG/DL
TSH SERPL DL<=0.05 MIU/L-ACNC: 2.34 UIU/ML (ref 0.45–4.5)
UROBILINOGEN UR STRIP-ACNC: <2 MG/DL
WBC # BLD AUTO: 5.8 THOUSAND/UL (ref 4.31–10.16)

## 2023-12-13 PROCEDURE — 85025 COMPLETE CBC W/AUTO DIFF WBC: CPT

## 2023-12-13 PROCEDURE — 86258 DGP ANTIBODY EACH IG CLASS: CPT

## 2023-12-13 PROCEDURE — 86803 HEPATITIS C AB TEST: CPT

## 2023-12-13 PROCEDURE — 86364 TISS TRNSGLTMNASE EA IG CLAS: CPT

## 2023-12-13 PROCEDURE — 99204 OFFICE O/P NEW MOD 45 MIN: CPT | Performed by: INTERNAL MEDICINE

## 2023-12-13 PROCEDURE — 36415 COLL VENOUS BLD VENIPUNCTURE: CPT

## 2023-12-13 PROCEDURE — 86231 EMA EACH IG CLASS: CPT

## 2023-12-13 PROCEDURE — 81003 URINALYSIS AUTO W/O SCOPE: CPT

## 2023-12-13 PROCEDURE — 82784 ASSAY IGA/IGD/IGG/IGM EACH: CPT

## 2023-12-13 PROCEDURE — 87389 HIV-1 AG W/HIV-1&-2 AB AG IA: CPT

## 2023-12-13 PROCEDURE — 80061 LIPID PANEL: CPT

## 2023-12-13 PROCEDURE — 84443 ASSAY THYROID STIM HORMONE: CPT

## 2023-12-13 PROCEDURE — 80053 COMPREHEN METABOLIC PANEL: CPT

## 2023-12-13 RX ORDER — ONDANSETRON 4 MG/1
4 TABLET, FILM COATED ORAL EVERY 8 HOURS PRN
Qty: 20 TABLET | Refills: 0 | Status: SHIPPED | OUTPATIENT
Start: 2023-12-13

## 2023-12-13 NOTE — PROGRESS NOTES
[facility] - Outpatient Consultation  Solange Worley 26 y.o. female MRN: [id number]  Encounter: [id number]          ASSESSMENT AND PLAN:      1. Abdominal bloating  - EGD; Future  2. Nausea  - EGD; Future  - ondansetron (ZOFRAN) 4 mg tablet; Take 1 tablet (4 mg total) by mouth every 8 (eight) hours as needed for nausea or vomiting  Dispense: 20 tablet; Refill: 0  3. Diarrhea    21-year-old female presenting for evaluation of significant longstanding abdominal bloating, recent intermittent episodes of loose stools and nausea. Awaiting results of recent celiac disease antibody profile, which we discussed may not be accurate to diagnose celiac disease if she has been avoiding gluten leading up to that test.  Will plan for EGD to rule out other luminal abnormalities to explain her symptoms and to obtain gastric and duodenal biopsies. For intermittent nausea we will also provide as needed Zofran while we await EGD. She may benefit from PPI trial to see if her nausea is secondary to atypical GERD. If diarrhea becomes more persistent rather than occasional intermittent bouts, can expand evaluation of this to include ruling out inflammatory bowel disease amongst other differentials. Close follow-up after EGD  ______________________________________________________________________    HPI:  William Pappas is a pleasant 21-year-old female presenting for evaluation of significant longstanding abdominal bloating and recent intermittent episodes of loose stools and nausea. Bloating has been present for as long as she can remember but more recently seems to be triggered by what ever she eats. For the past months she has eliminated gluten and feels like this has helped significantly in her symptoms. She is currently not eating any gluten. She did get blood work including a celiac disease panel this morning, results of which are still pending.   She occasionally gets GERD symptoms and uses Gaviscon but this is not a daily occurrence. The nausea that she experiences is unpredictable. Her mother has also recently eliminated gluten and feels significantly better. REVIEW OF SYSTEMS:    CONSTITUTIONAL: Denies any fever, chills, rigors, and weight loss. HEENT: Denies odynophagia, tinnitus  CARDIOVASCULAR: No chest pain or palpitations. RESPIRATORY: Denies any cough, hemoptysis, shortness of breath or dyspnea on exertion. GASTROINTESTINAL: As noted in the History of Present Illness. GENITOURINARY: No problems with urination. Denies any hematuria or dysuria. NEUROLOGIC: No dizziness or vertigo, denies headaches. MUSCULOSKELETAL: Denies any muscle or joint pain. SKIN: Denies skin rashes or itching. ENDOCRINE:  Denies intolerance to heat or cold. PSYCHOSOCIAL: Denies depression or anxiety. Denies any recent memory loss.        Historical Information   Past Medical History:   Diagnosis Date   • Celiac disease    • Seizure ([address])     No seizures in 10-12 years per patient     Past Surgical History:   Procedure Laterality Date   • WISDOM TOOTH EXTRACTION       Social History   Social History     Substance and Sexual Activity   Alcohol Use Yes    Comment: socially     Social History     Substance and Sexual Activity   Drug Use Never     Social History     Tobacco Use   Smoking Status Never   Smokeless Tobacco Never     Family History   Problem Relation Age of Onset   • Diabetes Maternal Grandfather    • Seizures Neg Hx        Meds/Allergies       Current Outpatient Medications:   •  Lactobacillus (PROBIOTIC ACIDOPHILUS PO)  •  ondansetron (ZOFRAN) 4 mg tablet  •  PREBIOTIC PRODUCT PO    Allergies   Allergen Reactions   • Amoxicillin Other (See Comments), Fever and Rash     Other reaction(s): Nausea and/or vomiting   • Oxycodone Fever, Vomiting and Rash   • Gluten Meal - Food Allergy GI Intolerance           Objective     Blood pressure 102/60, pulse 80, height 5' 4" (1.626 m), weight 88 kg (194 lb), last menstrual period 12/05/2023, not currently breastfeeding. Body mass index is 33.3 kg/m². PHYSICAL EXAM:      General Appearance:   Alert, cooperative, no distress   HEENT:   Normocephalic, atraumatic, anicteric. Neck:  Supple, symmetrical, trachea midline   Lungs:   Clear to auscultation bilaterally; no rales, rhonchi or wheezing; respirations unlabored    Heart[de-identified]   Regular rate and rhythm; no murmur, rub, or gallop. Abdomen:   Soft, non-tender, non-distended; normal bowel sounds; no masses, no organomegaly    Genitalia:   Deferred    Rectal:   Deferred    Extremities:  No cyanosis, clubbing or edema    Pulses:  2+ and symmetric    Skin:  No jaundice, rashes, or lesions          Lab Results:   No visits with results within 1 Day(s) from this visit. Latest known visit with results is:   Office Visit on 11/15/2023   Component Date Value   • Case Report 11/15/2023                      Value:Gynecologic Cytology Report                       Case: [id number]                                  Authorizing Provider:  ROZ Mehta           Collected:           11/15/2023 1628              Ordering Location:     [facility]       Received:            11/15/2023 1628                                     [facility] [place]                                                          First Screen:          Christiana Holter                                                                 Specimen:    LIQUID-BASED PAP, SCREENING, Cervix, Endocervical                                         • Primary Interpretation 11/15/2023 Negative for intraepithelial lesion or malignancy    • Specimen Adequacy 11/15/2023 Satisfactory for evaluation. Endocervical/transformation zone component present. • Additional Information 11/15/2023                      Value: This result contains rich text formatting which cannot be displayed here.    • N gonorrhoeae, DNA Probe 11/15/2023 Negative    • Chlamydia trachomatis, D* 11/15/2023 Negative          Radiology Results:   No results found.

## 2023-12-13 NOTE — PATIENT INSTRUCTIONS
Scheduled date of EGD(as of today):01.31.24  Physician performing EGD:DR Kirti Jackson  Location of EGD:[facility]  Instructions reviewed with patient by:PRINCE  Clearances: N/A

## 2023-12-14 LAB
ENDOMYSIUM IGA SER QL: NEGATIVE
GLIADIN PEPTIDE IGA SER-ACNC: 8 UNITS (ref 0–19)
GLIADIN PEPTIDE IGG SER-ACNC: 4 UNITS (ref 0–19)
IGA SERPL-MCNC: 205 MG/DL (ref 87–352)
TTG IGA SER-ACNC: <2 U/ML (ref 0–3)
TTG IGG SER-ACNC: <2 U/ML (ref 0–5)

## 2024-01-08 ENCOUNTER — OFFICE VISIT (OUTPATIENT)
Dept: URGENT CARE | Facility: CLINIC | Age: 24
End: 2024-01-08
Payer: COMMERCIAL

## 2024-01-08 VITALS
WEIGHT: 194 LBS | BODY MASS INDEX: 33.12 KG/M2 | HEIGHT: 64 IN | SYSTOLIC BLOOD PRESSURE: 110 MMHG | HEART RATE: 72 BPM | OXYGEN SATURATION: 99 % | DIASTOLIC BLOOD PRESSURE: 60 MMHG | RESPIRATION RATE: 16 BRPM | TEMPERATURE: 99 F

## 2024-01-08 DIAGNOSIS — L24.9 IRRITANT CONTACT DERMATITIS, UNSPECIFIED TRIGGER: Primary | ICD-10-CM

## 2024-01-08 PROCEDURE — 99213 OFFICE O/P EST LOW 20 MIN: CPT | Performed by: PHYSICIAN ASSISTANT

## 2024-01-08 RX ORDER — PREDNISONE 10 MG/1
TABLET ORAL
Qty: 30 TABLET | Refills: 0 | Status: SHIPPED | OUTPATIENT
Start: 2024-01-08

## 2024-01-08 NOTE — PATIENT INSTRUCTIONS
Follow-up with your primary care provider in the next 3-5 days.  Any new or worsening symptoms develop get re-evaluated sooner or proceed to the ER.  Take medications as prescribed.

## 2024-01-08 NOTE — PROGRESS NOTES
[facility]        NAME: Riana Conn is a 23 y.o. female  : 2000    MRN: [id number]  DATE: 2024  TIME: 9:12 AM    Assessment and Plan   Irritant contact dermatitis, unspecified trigger [L24.9]  1. Irritant contact dermatitis, unspecified trigger  predniSONE 10 mg tablet            Patient Instructions       Follow up with PCP in 3-5 days.  Proceed to  ER if symptoms worsen.    Chief Complaint     Chief Complaint   Patient presents with    Rash     Pt reports skin rash on multiple body sites with onset of symptoms yesterday. States itching for one week primarily on face, neck, and back. Denies any new product other then new bed sheets and towels used before washing. Reports Fiance has similar rash. Managing with hydrocortisone and benadryl.         History of Present Illness       Patient presents with a rash on face, behind ears, neck, chest, back, and arms. Really noticed rash yesterday but behind the ears for a few days to a week. Rash is itchy.  Fiance also with similar rash.  Did get new sheets for xmas and put them on the bed without washing them.  Also got new towels they used without washing.  Otherwise no changes to anything at home.  No rash on the legs  Denies fevers, trouble breathing, trouble swallowing, swelling of the tongue/throat/mouth, SOB.     Rash  Pertinent negatives include no fever or shortness of breath.       Review of Systems   Review of Systems   Constitutional:  Negative for fever.   HENT:  Negative for trouble swallowing.    Respiratory:  Negative for chest tightness and shortness of breath.    Skin:  Positive for rash.         Current Medications       Current Outpatient Medications:     predniSONE 10 mg tablet, Take 4 tablets orally for 3 days,  Then 3 tablets for 3 days, then 2 tablets for 3 days, then 1 tablet for 3 days., Disp: 30 tablet, Rfl: 0    Lactobacillus (PROBIOTIC ACIDOPHILUS PO), Take by mouth (Patient not taking: Reported on 2024),  "Disp: , Rfl:     ondansetron (ZOFRAN) 4 mg tablet, Take 1 tablet (4 mg total) by mouth every 8 (eight) hours as needed for nausea or vomiting (Patient not taking: Reported on 1/8/2024), Disp: 20 tablet, Rfl: 0    PREBIOTIC PRODUCT PO, Take by mouth (Patient not taking: Reported on 1/8/2024), Disp: , Rfl:     Current Allergies     Allergies as of 01/08/2024 - Reviewed 01/08/2024   Allergen Reaction Noted    Amoxicillin Other (See Comments), Fever, and Rash 12/23/2019    Oxycodone Fever, Vomiting, and Rash 12/23/2019    Gluten meal - food allergy GI Intolerance 11/22/2023            The following portions of the patient's history were reviewed and updated as appropriate: allergies, current medications, past family history, past medical history, past social history, past surgical history and problem list.     Past Medical History:   Diagnosis Date    Celiac disease     Seizure (HCC)     No seizures in 10-12 years per patient       Past Surgical History:   Procedure Laterality Date    WISDOM TOOTH EXTRACTION         Family History   Problem Relation Age of Onset    Diabetes Maternal Grandfather     Seizures Neg Hx          Medications have been verified.        Objective   /60 (BP Location: Left arm, Patient Position: Sitting)   Pulse 72   Temp 99 °F (37.2 °C)   Resp 16   Ht 5' 4\" (1.626 m)   Wt 88 kg (194 lb)   SpO2 99%   BMI 33.30 kg/m²   No LMP recorded.       Physical Exam     Physical Exam  Constitutional:       Appearance: Normal appearance.   HENT:      Nose: Nose normal.      Mouth/Throat:      Mouth: Mucous membranes are moist.      Pharynx: Oropharynx is clear.   Pulmonary:      Effort: No respiratory distress.   Skin:     Comments: Roughened erythematous and irritated rash over both cheeks, around the ears, chest, and back.  Distinct cut off at least around the pant line.  No drainage, discharge, or swelling noted.   Neurological:      Mental Status: She is alert.   Psychiatric:         Mood and " Affect: Mood normal.         Behavior: Behavior normal.

## 2024-01-17 ENCOUNTER — ANESTHESIA EVENT (OUTPATIENT)
Dept: ANESTHESIOLOGY | Facility: HOSPITAL | Age: 24
End: 2024-01-17

## 2024-01-17 ENCOUNTER — ANESTHESIA (OUTPATIENT)
Dept: ANESTHESIOLOGY | Facility: HOSPITAL | Age: 24
End: 2024-01-17

## 2024-01-30 RX ORDER — SODIUM CHLORIDE 9 MG/ML
125 INJECTION, SOLUTION INTRAVENOUS CONTINUOUS
Status: CANCELLED | OUTPATIENT
Start: 2024-01-30

## 2024-01-31 ENCOUNTER — HOSPITAL ENCOUNTER (OUTPATIENT)
Dept: GASTROENTEROLOGY | Facility: MEDICAL CENTER | Age: 24
Setting detail: OUTPATIENT SURGERY
Discharge: HOME | End: 2024-01-31
Payer: COMMERCIAL

## 2024-01-31 ENCOUNTER — ANESTHESIA EVENT (OUTPATIENT)
Dept: GASTROENTEROLOGY | Facility: MEDICAL CENTER | Age: 24
End: 2024-01-31

## 2024-01-31 ENCOUNTER — ANESTHESIA (OUTPATIENT)
Dept: GASTROENTEROLOGY | Facility: MEDICAL CENTER | Age: 24
End: 2024-01-31

## 2024-01-31 VITALS
TEMPERATURE: 98.1 F | SYSTOLIC BLOOD PRESSURE: 131 MMHG | HEART RATE: 63 BPM | RESPIRATION RATE: 16 BRPM | DIASTOLIC BLOOD PRESSURE: 78 MMHG | OXYGEN SATURATION: 96 %

## 2024-01-31 DIAGNOSIS — R11.0 NAUSEA: ICD-10-CM

## 2024-01-31 DIAGNOSIS — R14.0 ABDOMINAL BLOATING: ICD-10-CM

## 2024-01-31 LAB
EXT PREGNANCY TEST URINE: NEGATIVE
EXT. CONTROL: NORMAL

## 2024-01-31 PROCEDURE — 81025 URINE PREGNANCY TEST: CPT | Performed by: ANESTHESIOLOGY

## 2024-01-31 PROCEDURE — 43239 EGD BIOPSY SINGLE/MULTIPLE: CPT | Performed by: INTERNAL MEDICINE

## 2024-01-31 PROCEDURE — 88305 TISSUE EXAM BY PATHOLOGIST: CPT | Performed by: SPECIALIST

## 2024-01-31 RX ORDER — PROPOFOL 10 MG/ML
INJECTION, EMULSION INTRAVENOUS AS NEEDED
Status: DISCONTINUED | OUTPATIENT
Start: 2024-01-31 | End: 2024-01-31

## 2024-01-31 RX ORDER — SODIUM CHLORIDE 9 MG/ML
125 INJECTION, SOLUTION INTRAVENOUS CONTINUOUS
Status: DISCONTINUED | OUTPATIENT
Start: 2024-01-31 | End: 2024-02-04 | Stop reason: HOSPADM

## 2024-01-31 RX ORDER — OMEPRAZOLE 20 MG/1
20 CAPSULE, DELAYED RELEASE ORAL DAILY
Qty: 30 CAPSULE | Refills: 1 | Status: SHIPPED | OUTPATIENT
Start: 2024-01-31 | End: 2024-03-31

## 2024-01-31 RX ADMIN — PROPOFOL 50 MG: 10 INJECTION, EMULSION INTRAVENOUS at 10:24

## 2024-01-31 RX ADMIN — PROPOFOL 200 MG: 10 INJECTION, EMULSION INTRAVENOUS at 10:21

## 2024-01-31 RX ADMIN — SODIUM CHLORIDE: 0.9 INJECTION, SOLUTION INTRAVENOUS at 10:15

## 2024-01-31 NOTE — ANESTHESIA POSTPROCEDURE EVALUATION
Post-Op Assessment Note    CV Status:  Stable    Pain management: adequate       Mental Status:  Alert and awake   Hydration Status:  Euvolemic   PONV Controlled:  Controlled   Airway Patency:  Patent     Post Op Vitals Reviewed: Yes    No anethesia notable event occurred.    Staff: Anesthesiologist               BP      Temp      Pulse     Resp      SpO2      /78   Pulse 63   Temp 98.1 °F (36.7 °C) (Temporal)   Resp 16   LMP  (LMP Unknown)   SpO2 96%

## 2024-01-31 NOTE — H&P
History and Physical -  [facility]  Riana Conn 23 y.o. female MRN: [id number]                  HPI: Riana Conn is a 23 y.o. year old female who presents for EGD      REVIEW OF SYSTEMS: Per the HPI, and otherwise unremarkable.    Historical Information   Past Medical History:   Diagnosis Date    Celiac disease     Seizure (HCC)     No seizures in 10-12 years per patient     Past Surgical History:   Procedure Laterality Date    WISDOM TOOTH EXTRACTION       Social History   Social History     Substance and Sexual Activity   Alcohol Use Yes    Comment: socially     Social History     Substance and Sexual Activity   Drug Use Never     Social History     Tobacco Use   Smoking Status Never   Smokeless Tobacco Never     Family History   Problem Relation Age of Onset    Diabetes Maternal Grandfather     Seizures Neg Hx        Meds/Allergies       Current Outpatient Medications:     Lactobacillus (PROBIOTIC ACIDOPHILUS PO)    ondansetron (ZOFRAN) 4 mg tablet    PREBIOTIC PRODUCT PO    predniSONE 10 mg tablet    Current Facility-Administered Medications:     sodium chloride 0.9 % infusion, 125 mL/hr, Intravenous, Continuous    Allergies   Allergen Reactions    Amoxicillin Other (See Comments), Fever and Rash     Other reaction(s): Nausea and/or vomiting    Oxycodone Fever, Vomiting and Rash    Gluten Meal - Food Allergy GI Intolerance       Objective     /82   Pulse 89   Temp 98.1 °F (36.7 °C) (Temporal)   Resp 20   LMP  (LMP Unknown)   SpO2 98%       PHYSICAL EXAM    Gen: NAD  Head: NCAT  CV: RRR  CHEST: Clear  ABD: soft, NT/ND  EXT: no edema      ASSESSMENT/PLAN:  This is a 23 y.o. year old female here for EGD, and she is stable and optimized for her procedure.

## 2024-01-31 NOTE — ANESTHESIA PREPROCEDURE EVALUATION
Procedure:  EGD    Relevant Problems   ANESTHESIA (within normal limits)      CARDIO   (+) Essential hypertension      HEMATOLOGY (within normal limits)      PULMONARY (within normal limits)      Other   (+) Abdominal bloating   (+) History of seizure disorder   (+) Obesity (BMI 30.0-34.9)        Physical Exam    Airway    Mallampati score: II         Dental   No notable dental hx     Cardiovascular  Rhythm: regular, Cardiovascular exam normal    Pulmonary  Pulmonary exam normal     Other Findings  post-pubertal.      Anesthesia Plan  ASA Score- 2     Anesthesia Type- IV sedation with anesthesia with ASA Monitors.         Additional Monitors:     Airway Plan:            Plan Factors-    Chart reviewed.   Existing labs reviewed. Patient summary reviewed.    Patient is not a current smoker.              Induction- intravenous.    Postoperative Plan-     Informed Consent- Anesthetic plan and risks discussed with patient.

## 2024-01-31 NOTE — DISCHARGE INSTRUCTIONS
Upper Endoscopy   WHAT YOU NEED TO KNOW:   An upper endoscopy is also called an upper gastrointestinal (GI) endoscopy, or an esophagogastroduodenoscopy (EGD). It is a procedure to examine the inside of your esophagus, stomach, and duodenum (first part of the small intestine) with a scope. You may feel bloated, gassy, or have some abdominal discomfort after your procedure. Your throat may be sore for 24 to 36 hours. You may burp or pass gas from air that is still inside your body.         DISCHARGE INSTRUCTIONS:   Seek care immediately if:   You have sudden, severe abdominal pain.     You have problems swallowing.     You have a large amount of black, sticky bowel movements or blood in your bowel movements.     You have sudden trouble breathing.     You feel weak, lightheaded, or faint or your heart beats faster than normal for you.     Contact your healthcare provider if:   You have a fever and chills.      You have nausea or are vomiting.      Your abdomen is bloated or feels full and hard.     You have abdominal pain.   You have black, sticky bowel movements or blood in your bowel movements.  You have not had a bowel movement for 3 days after your procedure.  You have rash or hives.  You have questions or concerns about your procedure.    Activity:   Do not lift, strain, or run for 24 hours after your procedure.     Rest after your procedure. You have been given medicine to relax you. Do not drive or make important decisions until the day after your procedure. Return to your normal activity as directed.     Relieve gas and discomfort from bloating by lying on your right side with a heating pad on your abdomen. You may need to take short walks to help the gas move out. Eat small meals until bloating is relieved.  Follow up with your healthcare provider as directed: Write down your questions so you remember to ask them during your visits.     If you take a “blood thinner”, please review the specific instructions  from your endoscopist about when you should resume it. These can be found in the “Recommendation” and “Your Medication list” sections of this After Visit Summary.

## 2024-02-01 PROCEDURE — 88305 TISSUE EXAM BY PATHOLOGIST: CPT | Performed by: SPECIALIST

## 2024-02-15 ENCOUNTER — OFFICE VISIT (OUTPATIENT)
Dept: GASTROENTEROLOGY | Facility: CLINIC | Age: 24
End: 2024-02-15
Payer: COMMERCIAL

## 2024-02-15 VITALS
BODY MASS INDEX: 32.85 KG/M2 | SYSTOLIC BLOOD PRESSURE: 128 MMHG | WEIGHT: 192.4 LBS | TEMPERATURE: 98.7 F | DIASTOLIC BLOOD PRESSURE: 82 MMHG | HEIGHT: 64 IN

## 2024-02-15 DIAGNOSIS — R14.0 BLOATING: ICD-10-CM

## 2024-02-15 DIAGNOSIS — R10.13 DYSPEPSIA: Primary | ICD-10-CM

## 2024-02-15 PROCEDURE — 99213 OFFICE O/P EST LOW 20 MIN: CPT | Performed by: PHYSICIAN ASSISTANT

## 2024-02-15 NOTE — PROGRESS NOTES
[facility] - Outpatient Follow-up Note  Riana Conn 23 y.o. female MRN: [id number]  Encounter: [id number]          ASSESSMENT AND PLAN:      1. Dyspepsia  23-year-old pleasant female presenting for follow-up of longstanding bloating. EGD was normal and biopsies from the stomach and duodenum were negative for H. pylori and celiac disease, respectively. However, she has been on strict gluten-free diet for about 4 months. I explained she may have non-celiac gluten sensitivity, but we cannot definitively say if she has celiac disease without doing gluten challenge. She does not wish to undergo gluten challenge and accepts the diagnostic uncertainty. I also explained we could do celiac genetic testing, but this is not definitive either. She prefers to hold off on additional testing at this time and continue strict gluten-free diet.    Since her bloating seems to occur after meals, she is interested in pursuing gastric emptying study to rule out gastroparesis. I also recommended low FODMAP diet for 4 to 6 weeks with gradual reintroduction to identify other trigger foods. She should continue omeprazole 40 mg daily for 2 months, then she can stop.    If symptoms persist, would consider SIBO breath testing and treatment with antibiotics if warranted.    Follow-up in 6 months  ______________________________________________________________________    SUBJECTIVE: 23-year-old female presenting for follow-up of bloating, nausea, loose stools.  She underwent EGD 2 weeks ago which was normal, and biopsies from the stomach and duodenum were negative for H. pylori and celiac disease, respectively. She continues to eat strict gluten-free diet because otherwise she gets vomiting, diarrhea, bloating. She still has some issues with bloating especially after eating meals with a lot of cheese or spice. She will feel bloated after dinner, overnight, until the next morning when she has a bowel movement.   She also believes there are other foods that seem to trigger the bloating, but she is not sure.  She states that her bowel movements are regular and formed for the most part. She denies any blood in the stool. No abnormal weight loss. No vomiting. Her reflux has much improved with initiation of omeprazole.    Answers submitted by the patient for this visit:  Abdominal Pain Questionnaire (Submitted on 2/15/2024)  Chief Complaint: Abdominal pain  Chronicity: chronic  Onset: more than 1 year ago  Onset quality: gradual  Frequency: every several days  Episode duration: 8 Hours  Progression since onset: unchanged  Pain location: epigastric region  Pain - numeric: 4/10  Pain quality: a sensation of fullness  anorexia: Yes  arthralgias: No  belching: Yes  constipation: Yes  diarrhea: Yes  dysuria: No  fever: No  flatus: Yes  frequency: No  headaches: No  hematochezia: No  hematuria: No  melena: No  myalgias: No  nausea: Yes  weight loss: No  vomiting: Yes  Aggravated by: eating  Relieved by: bowel movements  Diagnostic workup: upper endoscopy      REVIEW OF SYSTEMS IS OTHERWISE NEGATIVE.      Historical Information   Past Medical History:   Diagnosis Date    Celiac disease     Seizure (HCC)     No seizures in 10-12 years per patient     Past Surgical History:   Procedure Laterality Date    WISDOM TOOTH EXTRACTION       Social History   Social History     Substance and Sexual Activity   Alcohol Use Yes    Comment: socially     Social History     Substance and Sexual Activity   Drug Use Never     Social History     Tobacco Use   Smoking Status Never   Smokeless Tobacco Never     Family History   Problem Relation Age of Onset    Diabetes Maternal Grandfather     Seizures Neg Hx        Meds/Allergies       Current Outpatient Medications:     omeprazole (PriLOSEC) 20 mg delayed release capsule    Lactobacillus (PROBIOTIC ACIDOPHILUS PO)    ondansetron (ZOFRAN) 4 mg tablet    PREBIOTIC PRODUCT PO    predniSONE 10 mg  "tablet    Allergies   Allergen Reactions    Amoxicillin Other (See Comments), Fever and Rash     Other reaction(s): Nausea and/or vomiting    Oxycodone Fever, Vomiting and Rash    Gluten Meal - Food Allergy GI Intolerance           Objective     Blood pressure 128/82, temperature 98.7 °F (37.1 °C), temperature source Tympanic, height 5' 4\" (1.626 m), weight 87.3 kg (192 lb 6.4 oz), not currently breastfeeding. Body mass index is 33.03 kg/m².      PHYSICAL EXAM:      General Appearance:   Alert, cooperative, no distress   HEENT:   Normocephalic, atraumatic, anicteric.     Neck:  Supple, symmetrical, trachea midline   Lungs:   Clear to auscultation bilaterally; no rales, rhonchi or wheezing; respirations unlabored    Heart::   Regular rate and rhythm; no murmur, rub, or gallop.   Abdomen:   Soft, non-tender, non-distended; normal bowel sounds; no masses, no organomegaly    Genitalia:   Deferred    Rectal:   Deferred    Extremities:  No cyanosis, clubbing or edema    Pulses:  2+ and symmetric    Skin:  No jaundice, rashes, or lesions    Lymph nodes:  No palpable cervical lymphadenopathy        Lab Results:   No visits with results within 1 Day(s) from this visit.   Latest known visit with results is:   Hospital Outpatient Visit on 01/31/2024   Component Date Value    EXT Preg Test, Ur 01/31/2024 Negative     Control 01/31/2024 Valid     Case Report 01/31/2024                      Value:Surgical Pathology Report                         Case: [id number]                                  Authorizing Provider:  Rosetta Carpio DO    Collected:           01/31/2024 1023              Ordering Location:     [facility]        Received:            01/31/2024 [address] [place] [address] Endoscopy                                                     Pathologist:           Beck Pierce MD                                                     Specimens:   A) - Duodenum, duodenal bx R/O " celiac                                                               B) - Stomach, gastric bx R/O H Pylori                                                      Final Diagnosis 01/31/2024                      Value:This result contains rich text formatting which cannot be displayed here.    Additional Information 01/31/2024                      Value:This result contains rich text formatting which cannot be displayed here.    Gross Description 01/31/2024                      Value:This result contains rich text formatting which cannot be displayed here.         Radiology Results:   EGD    Result Date: 1/31/2024  Narrative: Table formatting from the original result was not included. [facility] [address] [place] [address] [phone] DATE OF SERVICE: 1/31/24 PHYSICIAN(S): Attending: Rosetta Carpio DO Fellow: No Staff Documented INDICATION: Nausea, Abdominal bloating POST-OP DIAGNOSIS: See the impression below. PREPROCEDURE: Informed consent was obtained for the procedure, including sedation.  Risks of perforation, hemorrhage, adverse drug reaction and aspiration were discussed. The patient was placed in the left lateral decubitus position. Patient was explained about the risks and benefits of the procedure. Risks including but not limited to bleeding, infection, and perforation were explained in detail. Also explained about less than 100% sensitivity with the exam and other alternatives. PROCEDURE: EGD DETAILS OF PROCEDURE: Patient was taken to the procedure room where a time out was performed to confirm correct patient and correct procedure. The patient underwent monitored anesthesia care, which was administered by an anesthesia professional. The patient's blood pressure, heart rate, level of consciousness, respirations and oxygen were monitored throughout the procedure. The scope was advanced to the second part of the duodenum. Retroflexion was performed in the fundus.  The patient experienced no blood loss. The procedure was not difficult. The patient tolerated the procedure well. There were no apparent adverse events. ANESTHESIA INFORMATION: ASA: II Anesthesia Type: IV Sedation with Anesthesia MEDICATIONS: No administrations occurring from 1016 to 1028 on 01/31/24 FINDINGS: The esophagus, stomach and duodenum appeared normal. Z-line is 38 cm from the incisors. Performed random biopsy using biopsy forceps to rule out celiac disease and H. pylori. SPECIMENS: ID Type Source Tests Collected by Time Destination 1 : duodenal bx R/O celiac Tissue Duodenum TISSUE EXAM Rosetta Carpio DO 1/31/2024 10:23 AM  2 : gastric bx R/O H Pylori Tissue Stomach TISSUE EXAM Rosetta Carpio DO 1/31/2024 10:26 AM      Impression: The esophagus, stomach and duodenum appeared normal. Performed random biopsy to rule out celiac disease and H. pylori. RECOMMENDATION:  Await pathology results Start omeprazole 20mg daily 30-60 minutes before first meal of the day Follow up in GI office   Rosetta Carpio DO

## 2024-02-23 DIAGNOSIS — R11.0 NAUSEA: ICD-10-CM

## 2024-02-23 RX ORDER — OMEPRAZOLE 20 MG/1
20 CAPSULE, DELAYED RELEASE ORAL DAILY
Qty: 90 CAPSULE | Refills: 1 | Status: SHIPPED | OUTPATIENT
Start: 2024-02-23

## 2024-03-04 ENCOUNTER — OFFICE VISIT (OUTPATIENT)
Dept: URGENT CARE | Facility: CLINIC | Age: 24
End: 2024-03-04
Payer: COMMERCIAL

## 2024-03-04 VITALS
RESPIRATION RATE: 16 BRPM | TEMPERATURE: 101.9 F | BODY MASS INDEX: 32.44 KG/M2 | HEIGHT: 64 IN | WEIGHT: 190 LBS | SYSTOLIC BLOOD PRESSURE: 114 MMHG | OXYGEN SATURATION: 98 % | DIASTOLIC BLOOD PRESSURE: 60 MMHG | HEART RATE: 128 BPM

## 2024-03-04 DIAGNOSIS — R68.89 FLU-LIKE SYMPTOMS: Primary | ICD-10-CM

## 2024-03-04 DIAGNOSIS — Z11.52 ENCOUNTER FOR SCREENING LABORATORY TESTING FOR COVID-19 VIRUS: ICD-10-CM

## 2024-03-04 PROCEDURE — 99213 OFFICE O/P EST LOW 20 MIN: CPT | Performed by: NURSE PRACTITIONER

## 2024-03-04 PROCEDURE — 87636 SARSCOV2 & INF A&B AMP PRB: CPT | Performed by: NURSE PRACTITIONER

## 2024-03-04 RX ORDER — METHYLPREDNISOLONE 4 MG/1
TABLET ORAL
Qty: 21 EACH | Refills: 0 | Status: SHIPPED | OUTPATIENT
Start: 2024-03-04

## 2024-03-04 RX ORDER — FLUTICASONE PROPIONATE 50 MCG
2 SPRAY, SUSPENSION (ML) NASAL DAILY
Qty: 9.9 ML | Refills: 0 | Status: SHIPPED | OUTPATIENT
Start: 2024-03-04 | End: 2024-04-03

## 2024-03-04 RX ORDER — BENZONATATE 100 MG/1
200 CAPSULE ORAL 3 TIMES DAILY PRN
Qty: 20 CAPSULE | Refills: 0 | Status: SHIPPED | OUTPATIENT
Start: 2024-03-04

## 2024-03-04 NOTE — LETTER
March 4, 2024     Patient: Riana Conn   YOB: 2000   Date of Visit: 3/4/2024       To Whom it May Concern:    Riana Conn was seen in my clinic on 3/4/2024. She may return to work on 3/8/2024 . Please excuse from work 3/4 and 3/5.    If you have any questions or concerns, please don't hesitate to call.         Sincerely,          ROZ Read

## 2024-03-05 LAB
FLUAV RNA RESP QL NAA+PROBE: NEGATIVE
FLUBV RNA RESP QL NAA+PROBE: NEGATIVE
SARS-COV-2 RNA RESP QL NAA+PROBE: POSITIVE

## 2024-03-05 NOTE — PROGRESS NOTES
[facility]        NAME: Riana Conn is a 24 y.o. female  : 2000    MRN: [id number]  DATE: 2024  TIME: 7:23 PM    Assessment and Plan   Flu-like symptoms [R68.89]  1. Flu-like symptoms  fluticasone (FLONASE) 50 mcg/act nasal spray    methylPREDNISolone 4 MG tablet therapy pack    benzonatate (TESSALON PERLES) 100 mg capsule      2. Encounter for screening laboratory testing for COVID-19 virus  Covid/Flu- Office Collect Normal    Covid/Flu- Office Collect Normal        Acute symptomatic not responding conservative treatment educated patient most likely viral in origin no recommendation for antibiotic at this time will send Flonase 2 sprays each nostril once daily Medrol Dosepak and Tessalon Perles 3 times daily as needed for cough will send COVID flu PCR follow-up with PCP with worsening symptoms no improvement    Patient Instructions       Follow up with PCP in 3-5 days.  Proceed to  ER if symptoms worsen.    If tests have been performed at [facility], our office will contact you with results if changes need to be made to the care plan discussed with you at the visit.  [facility]    Chief Complaint     Chief Complaint   Patient presents with   • Cold Like Symptoms     Pt reports last night she developed a cough, body aches, fatigue and fever. Tmax 101. Taking tylenol and mucinex. Needs a work note.          History of Present Illness       Patient is a 24-year-old female arrives with starting last night decreased appetite fever fatigue body aches sinus pressure nasal congestion postnasal drainage cough and nausea.  Has not tested for COVID or flu        Review of Systems   Review of Systems   Constitutional:  Positive for fatigue and fever. Negative for activity change, appetite change and chills.   HENT:  Positive for congestion, postnasal drip, sinus pressure and sinus pain. Negative for rhinorrhea, sneezing and sore throat.     Respiratory:  Positive for cough. Negative for chest tightness, shortness of breath and wheezing.    Cardiovascular:  Negative for chest pain and palpitations.   Gastrointestinal:  Positive for nausea. Negative for abdominal pain, constipation, diarrhea and vomiting.   Musculoskeletal:  Positive for myalgias. Negative for arthralgias.   Skin:  Negative for color change, pallor and rash.   Neurological:  Negative for dizziness, weakness, light-headedness and headaches.   Hematological:  Negative for adenopathy.   Psychiatric/Behavioral:  Negative for agitation and confusion.          Current Medications       Current Outpatient Medications:   •  benzonatate (TESSALON PERLES) 100 mg capsule, Take 2 capsules (200 mg total) by mouth 3 (three) times a day as needed for cough, Disp: 20 capsule, Rfl: 0  •  fluticasone (FLONASE) 50 mcg/act nasal spray, 2 sprays into each nostril daily, Disp: 9.9 mL, Rfl: 0  •  methylPREDNISolone 4 MG tablet therapy pack, Use as directed on package, Disp: 21 each, Rfl: 0  •  omeprazole (PriLOSEC) 20 mg delayed release capsule, TAKE 1 CAPSULE BY MOUTH EVERY DAY, Disp: 90 capsule, Rfl: 1  •  Lactobacillus (PROBIOTIC ACIDOPHILUS PO), Take by mouth (Patient not taking: Reported on 1/8/2024), Disp: , Rfl:   •  ondansetron (ZOFRAN) 4 mg tablet, Take 1 tablet (4 mg total) by mouth every 8 (eight) hours as needed for nausea or vomiting (Patient not taking: Reported on 1/8/2024), Disp: 20 tablet, Rfl: 0  •  PREBIOTIC PRODUCT PO, Take by mouth (Patient not taking: Reported on 1/8/2024), Disp: , Rfl:   •  predniSONE 10 mg tablet, Take 4 tablets orally for 3 days,  Then 3 tablets for 3 days, then 2 tablets for 3 days, then 1 tablet for 3 days., Disp: 30 tablet, Rfl: 0    Current Allergies     Allergies as of 03/04/2024 - Reviewed 03/04/2024   Allergen Reaction Noted   • Amoxicillin Other (See Comments), Fever, and Rash 12/23/2019   • Oxycodone Fever, Vomiting, and Rash 12/23/2019   • Gluten meal -  "food allergy GI Intolerance 11/22/2023            The following portions of the patient's history were reviewed and updated as appropriate: allergies, current medications, past family history, past medical history, past social history, past surgical history and problem list.     Past Medical History:   Diagnosis Date   • Celiac disease    • Seizure (HCC)     No seizures in 10-12 years per patient       Past Surgical History:   Procedure Laterality Date   • WISDOM TOOTH EXTRACTION         Family History   Problem Relation Age of Onset   • Diabetes Maternal Grandfather    • Seizures Neg Hx          Medications have been verified.        Objective   /60   Pulse (!) 128   Temp (!) 101.9 °F (38.8 °C)   Resp 16   Ht 5' 4\" (1.626 m)   Wt 86.2 kg (190 lb)   SpO2 98%   BMI 32.61 kg/m²   No LMP recorded.       Physical Exam     Physical Exam  Vitals and nursing note reviewed.   Constitutional:       General: She is not in acute distress.     Appearance: Normal appearance. She is ill-appearing. She is not diaphoretic.   HENT:      Head: Normocephalic and atraumatic.      Right Ear: Tympanic membrane, ear canal and external ear normal. There is no impacted cerumen.      Left Ear: Tympanic membrane, ear canal and external ear normal. There is no impacted cerumen.      Nose: Congestion present. No rhinorrhea.      Mouth/Throat:      Pharynx: Posterior oropharyngeal erythema present.   Eyes:      General: No scleral icterus.        Right eye: No discharge.         Left eye: No discharge.      Conjunctiva/sclera: Conjunctivae normal.   Cardiovascular:      Rate and Rhythm: Normal rate and regular rhythm.   Pulmonary:      Effort: Pulmonary effort is normal. No respiratory distress.      Breath sounds: Normal breath sounds. No stridor. No wheezing, rhonchi or rales.   Musculoskeletal:         General: Normal range of motion.      Cervical back: Normal range of motion.   Lymphadenopathy:      Cervical: Cervical " adenopathy present.   Skin:     Coloration: Skin is not jaundiced or pale.      Findings: No bruising, erythema or rash.   Neurological:      General: No focal deficit present.      Mental Status: She is alert and oriented to person, place, and time.   Psychiatric:         Mood and Affect: Mood normal.         Behavior: Behavior normal.         Thought Content: Thought content normal.         Judgment: Judgment normal.

## 2024-04-04 ENCOUNTER — OFFICE VISIT (OUTPATIENT)
Dept: URGENT CARE | Facility: CLINIC | Age: 24
End: 2024-04-04
Payer: COMMERCIAL

## 2024-04-04 VITALS
HEART RATE: 77 BPM | HEIGHT: 64 IN | RESPIRATION RATE: 16 BRPM | TEMPERATURE: 97.7 F | OXYGEN SATURATION: 99 % | WEIGHT: 185 LBS | DIASTOLIC BLOOD PRESSURE: 72 MMHG | BODY MASS INDEX: 31.58 KG/M2 | SYSTOLIC BLOOD PRESSURE: 122 MMHG

## 2024-04-04 DIAGNOSIS — J02.9 ACUTE PHARYNGITIS, UNSPECIFIED ETIOLOGY: ICD-10-CM

## 2024-04-04 DIAGNOSIS — J01.00 ACUTE MAXILLARY SINUSITIS, RECURRENCE NOT SPECIFIED: Primary | ICD-10-CM

## 2024-04-04 LAB — S PYO AG THROAT QL: NEGATIVE

## 2024-04-04 PROCEDURE — 99213 OFFICE O/P EST LOW 20 MIN: CPT | Performed by: PHYSICIAN ASSISTANT

## 2024-04-04 PROCEDURE — 87880 STREP A ASSAY W/OPTIC: CPT | Performed by: PHYSICIAN ASSISTANT

## 2024-04-04 PROCEDURE — 87070 CULTURE OTHR SPECIMN AEROBIC: CPT | Performed by: PHYSICIAN ASSISTANT

## 2024-04-04 RX ORDER — AZITHROMYCIN 250 MG/1
TABLET, FILM COATED ORAL
Qty: 6 TABLET | Refills: 0 | Status: SHIPPED | OUTPATIENT
Start: 2024-04-04 | End: 2024-04-08

## 2024-04-04 NOTE — PROGRESS NOTES
"[facility]        NAME: Riana Conn is a 24 y.o. female  : 2000    MRN: [id number]  DATE: 2024  TIME: 10:10 AM    /72   Pulse 77   Temp 97.7 °F (36.5 °C)   Resp 16   Ht 5' 4\" (1.626 m)   Wt 83.9 kg (185 lb)   SpO2 99%   BMI 31.76 kg/m²     Assessment and Plan   Acute maxillary sinusitis, recurrence not specified [J01.00]  1. Acute maxillary sinusitis, recurrence not specified  azithromycin (ZITHROMAX) 250 mg tablet    POCT rapid strepA      2. Acute pharyngitis, unspecified etiology  azithromycin (ZITHROMAX) 250 mg tablet    POCT rapid strepA            Patient Instructions       Follow up with PCP in 3-5 days.  Proceed to  ER if symptoms worsen.    Chief Complaint     Chief Complaint   Patient presents with    Sore Throat     Pt reports 10 days ago she developed congestion, cough and sore throat. Reports cough/congestion improving. Denies fevers.          History of Present Illness       Pt with 1-2 weeks congestion sore throat and productive cough     Sore Throat   Associated symptoms include congestion and coughing.       Review of Systems   Review of Systems   Constitutional: Negative.    HENT:  Positive for congestion and sore throat.    Eyes: Negative.    Respiratory:  Positive for cough.    Cardiovascular: Negative.    Gastrointestinal: Negative.    Endocrine: Negative.    Genitourinary: Negative.    Musculoskeletal: Negative.    Skin: Negative.    Allergic/Immunologic: Negative.    Neurological: Negative.    Hematological: Negative.    Psychiatric/Behavioral: Negative.     All other systems reviewed and are negative.        Current Medications       Current Outpatient Medications:     azithromycin (ZITHROMAX) 250 mg tablet, Take 2 tablets today then 1 tablet daily x 4 days, Disp: 6 tablet, Rfl: 0    omeprazole (PriLOSEC) 20 mg delayed release capsule, TAKE 1 CAPSULE BY MOUTH EVERY DAY, Disp: 90 capsule, Rfl: 1    benzonatate (TESSALON PERLES) 100 mg capsule, Take " "2 capsules (200 mg total) by mouth 3 (three) times a day as needed for cough (Patient not taking: Reported on 4/4/2024), Disp: 20 capsule, Rfl: 0    fluticasone (FLONASE) 50 mcg/act nasal spray, 2 sprays into each nostril daily, Disp: 9.9 mL, Rfl: 0    Lactobacillus (PROBIOTIC ACIDOPHILUS PO), Take by mouth (Patient not taking: Reported on 1/8/2024), Disp: , Rfl:     methylPREDNISolone 4 MG tablet therapy pack, Use as directed on package (Patient not taking: Reported on 4/4/2024), Disp: 21 each, Rfl: 0    ondansetron (ZOFRAN) 4 mg tablet, Take 1 tablet (4 mg total) by mouth every 8 (eight) hours as needed for nausea or vomiting (Patient not taking: Reported on 1/8/2024), Disp: 20 tablet, Rfl: 0    PREBIOTIC PRODUCT PO, Take by mouth (Patient not taking: Reported on 1/8/2024), Disp: , Rfl:     predniSONE 10 mg tablet, Take 4 tablets orally for 3 days,  Then 3 tablets for 3 days, then 2 tablets for 3 days, then 1 tablet for 3 days., Disp: 30 tablet, Rfl: 0    Current Allergies     Allergies as of 04/04/2024 - Reviewed 04/04/2024   Allergen Reaction Noted    Amoxicillin Other (See Comments), Fever, and Rash 12/23/2019    Oxycodone Fever, Vomiting, and Rash 12/23/2019    Gluten meal - food allergy GI Intolerance 11/22/2023            The following portions of the patient's history were reviewed and updated as appropriate: allergies, current medications, past family history, past medical history, past social history, past surgical history and problem list.     Past Medical History:   Diagnosis Date    Celiac disease     Seizure (HCC)     No seizures in 10-12 years per patient       Past Surgical History:   Procedure Laterality Date    WISDOM TOOTH EXTRACTION         Family History   Problem Relation Age of Onset    Diabetes Maternal Grandfather     Seizures Neg Hx          Medications have been verified.        Objective   /72   Pulse 77   Temp 97.7 °F (36.5 °C)   Resp 16   Ht 5' 4\" (1.626 m)   Wt 83.9 kg (185 " lb)   SpO2 99%   BMI 31.76 kg/m²        Physical Exam     Physical Exam  Vitals and nursing note reviewed.   Constitutional:       Appearance: She is well-developed and normal weight.   HENT:      Head: Normocephalic and atraumatic.      Right Ear: Tympanic membrane and ear canal normal.      Left Ear: Tympanic membrane and ear canal normal.      Nose: Congestion and rhinorrhea present.      Comments: Yellow d/c  max sinus tender  boggy mucosa      Mouth/Throat:      Mouth: Mucous membranes are moist.      Pharynx: Posterior oropharyngeal erythema present.      Tonsils: Tonsillar exudate present. No tonsillar abscesses.   Eyes:      Extraocular Movements:      Right eye: Abnormal extraocular motion present.      Left eye: Abnormal extraocular motion present.      Conjunctiva/sclera: Conjunctivae normal.      Pupils: Pupils are equal, round, and reactive to light.   Cardiovascular:      Rate and Rhythm: Normal rate and regular rhythm.      Heart sounds: Normal heart sounds.   Pulmonary:      Effort: Pulmonary effort is normal.      Breath sounds: Normal breath sounds.   Abdominal:      General: Bowel sounds are normal.      Palpations: Abdomen is soft.   Musculoskeletal:      Cervical back: Normal range of motion and neck supple.   Lymphadenopathy:      Cervical: Cervical adenopathy present.   Skin:     General: Skin is warm.      Capillary Refill: Capillary refill takes less than 2 seconds.   Neurological:      Mental Status: She is alert and oriented to person, place, and time.

## 2024-04-06 LAB — BACTERIA THROAT CULT: NORMAL

## 2024-04-17 ENCOUNTER — HOSPITAL ENCOUNTER (OUTPATIENT)
Dept: NUCLEAR MEDICINE | Facility: HOSPITAL | Age: 24
Discharge: HOME | End: 2024-04-17
Payer: COMMERCIAL

## 2024-04-17 DIAGNOSIS — R10.13 DYSPEPSIA: ICD-10-CM

## 2024-04-17 PROCEDURE — A9541 TC99M SULFUR COLLOID: HCPCS

## 2024-04-17 PROCEDURE — 78264 GASTRIC EMPTYING IMG STUDY: CPT

## 2024-04-17 PROCEDURE — G1004 CDSM NDSC: HCPCS

## 2024-04-24 DIAGNOSIS — R11.0 NAUSEA: ICD-10-CM

## 2024-04-24 RX ORDER — OMEPRAZOLE 20 MG/1
20 CAPSULE, DELAYED RELEASE ORAL DAILY
Qty: 90 CAPSULE | Refills: 1 | Status: SHIPPED | OUTPATIENT
Start: 2024-04-24

## 2024-04-30 ENCOUNTER — OFFICE VISIT (OUTPATIENT)
Dept: PHYSICAL THERAPY | Facility: CLINIC | Age: 24
End: 2024-04-30
Payer: COMMERCIAL

## 2024-04-30 DIAGNOSIS — M25.511 ACUTE PAIN OF RIGHT SHOULDER: Primary | ICD-10-CM

## 2024-04-30 PROCEDURE — 97140 MANUAL THERAPY 1/> REGIONS: CPT | Performed by: PHYSICAL THERAPIST

## 2024-04-30 PROCEDURE — 97010 HOT OR COLD PACKS THERAPY: CPT | Performed by: PHYSICAL THERAPIST

## 2024-04-30 PROCEDURE — 97161 PT EVAL LOW COMPLEX 20 MIN: CPT | Performed by: PHYSICAL THERAPIST

## 2024-04-30 NOTE — PROGRESS NOTES
PT Evaluation     Today's date: 2024  Patient name: Riana Conn  : 2000  MRN: [id number]  Referring provider: Mk Cuello, *  Dx:   Encounter Diagnosis     ICD-10-CM    1. Acute pain of right shoulder  M25.511                      Assessment  Assessment details: Patient is a 24 y.o. female who presents to therapy direct access for R shoulder pain. Patient presents with decreased shoulder ROM, decreased shoulder girdle strength,  and rounded shoulder posture, and clinical tests for shoulder impingement and instability. These impairments limit patient with dressing, reaching overhead, performing household chores and ADLs, performing work as a , and recreational weight training. Patient requires skilled PT to address above mentioned impairments and improve function in home and community. Patient is in agreement with this plan. Patient responded well to initial treatment with improved AROM following manual therapy.  Impairments: abnormal muscle tone, abnormal or restricted ROM, impaired physical strength, lacks appropriate home exercise program, pain with function, scapular dyskinesis and poor posture     Symptom irritability: moderateUnderstanding of Dx/Px/POC: good   Prognosis: good    Goals  Short term goals:  Patient is independent in home program to support plan of care and improve function. - 2 weeks  Patient demonstrates 140 degrees flexion PROM to reduce difficulty with reaching into top shelf. - 2 weeks  Patient demonstrates behind the back IR AROM to spinal level T12 so she can get dressed with less pain. - 3 weeks    Long term goals:  Patient demonstrates improvement in community participation with increase in FOTO score by 15%. - 8 weeks  Patient demonstrates at least 150 degrees shoulder flexion AROM for less difficulty reaching overhead and completing ADLs. - 8 weeks  Patient demonstrates at least 5/5 shoulder strength so she can return to recreational exercise  without pain. - 8 weeks  Patient displays low irritability of symptoms with negative special tests for impingement and instability, so she can resume full work as .- 8 weeks          Plan  Patient would benefit from: skilled physical therapy  Planned modality interventions: cryotherapy  Planned therapy interventions: activity modification, ADL training, body mechanics training, flexibility, functional ROM exercises, home exercise program, therapeutic exercise, therapeutic activities, stretching, strengthening, patient education, neuromuscular re-education, massage, manual therapy, joint mobilization and IASTM  Frequency: 2x week  Duration in weeks: 8  Plan of Care beginning date: 2024  Plan of Care expiration date: 2024  Treatment plan discussed with: patient    Subjective Evaluation    History of Present Illness  Date of onset: 2024  Mechanism of injury: Patient presents to therapy direct access for R shoulder pain. Patient was walking dog and he pulled to sheryl rabbit which pulled shoulder. Patient felt a little pop, with pain during. Patient reports gradual worsening of pain since.     Symptoms: R shoulder pain, increases as day goes on, when reaching overhead, reaching behind back, dressing (putting jacket on), pulling door open/closed. Patient is avoiding any lifting with R shoulder. Patient avoiding weight training right now due to pain, and is walking dog with left arm. Patient denies paraesthesias, denies sleep disturbance.    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 H/o seizure but not for many years, no medication required. L ankle sprain, resolved.  Patient Goals  Patient goals for therapy: decreased pain and increased motion  Patient goal: be able weight training without pain  Pain  Current pain rating: 3  At best pain ratin  At worst pain ratin        Objective     Static  "Posture     Shoulders  Rounded.    Scapulae  Left depressed and right depressed.    Palpation     Right   Hypertonic in the infraspinatus.   Tenderness of the biceps and infraspinatus.   Trigger point to infraspinatus.     Active Range of Motion   Left Shoulder   Flexion: 165 degrees   Abduction: WFL  External rotation BTH: T3   Internal rotation BTB: T7     Right Shoulder   Flexion: 125 degrees with pain  Abduction: 110 degrees with pain  External rotation BTH: C3 with pain  Internal rotation BTB: L4 with pain    Passive Range of Motion     Right Shoulder   Flexion: 130 degrees   Abduction: 118 degrees   External rotation 45°: 72 degrees with pain  Internal rotation 45°: WFL    Joint Play     Right Shoulder  Hypomobile in the anterior capsule, posterior capsule and inferior capsule.     Strength/Myotome Testing     Right Shoulder     Planes of Motion   Flexion: 4-   Abduction: 4-   External rotation at 45°: 4+ (pain)   Internal rotation at 45°: 4+ (pain)     Tests     Right Shoulder   Positive apprehension, drop arm, Hawkin's, passive horizontal adduction, resisted supine external rotation and posterior apprehension.   Negative external rotation lag sign, Neer's and sulcus sign.     Additional Tests Details  Drop arm (+) for pain, not weakness             Precautions: previous history of seizures (none recently).      Manuals 4/30            Infraspinatus STM/TPR ASIF            R GH jt p-a mob gr 1-3 ASIF            R shld PROM MWM Asif                         Neuro Re-Ed                                       Scap retraction 10x5\"            ER w/ retraction             Shld ext w/retraction                                       Ther Ex                          Supine flexion PROM with other arm 5x10\"            Table slides AROM 10x5\"            Pulleys flex                          Cross body adduction                          Wand abd AAROM             Ther Activity                                       Gait Training "                                       Modalities             Cold pack end 8'

## 2024-05-02 ENCOUNTER — OFFICE VISIT (OUTPATIENT)
Dept: PHYSICAL THERAPY | Facility: CLINIC | Age: 24
End: 2024-05-02
Payer: COMMERCIAL

## 2024-05-02 DIAGNOSIS — M25.511 ACUTE PAIN OF RIGHT SHOULDER: Primary | ICD-10-CM

## 2024-05-02 PROCEDURE — 97110 THERAPEUTIC EXERCISES: CPT | Performed by: PHYSICAL THERAPIST

## 2024-05-02 PROCEDURE — 97010 HOT OR COLD PACKS THERAPY: CPT | Performed by: PHYSICAL THERAPIST

## 2024-05-02 PROCEDURE — 97140 MANUAL THERAPY 1/> REGIONS: CPT | Performed by: PHYSICAL THERAPIST

## 2024-05-02 PROCEDURE — 97112 NEUROMUSCULAR REEDUCATION: CPT | Performed by: PHYSICAL THERAPIST

## 2024-05-02 NOTE — PROGRESS NOTES
"Daily Note     Today's date: 2024  Patient name: Riana Conn  : 2000  MRN: [id number]  Referring provider: Mk Cuello, *  Dx:   Encounter Diagnosis     ICD-10-CM    1. Acute pain of right shoulder  M25.511                      Subjective: patient reports a little sore following evaluation, but not bad. She often gets soreness at night depending on sleeping position.      Objective: See treatment diary below    85 deg. ER PROM  130 deg. Flexion PROM    Assessment: Patient tolerated manual therapy well, but noted discomfort with end range stretching. Patient demonstrated significant improvement in shoulder ER PROM, but no change with flexion. Educated patient on exercise intensity with home program, avoiding anything more than minor discomfort with stretches. Also educated patient on sleeping and pillow positioning to avoid soreness at night. She verbalized understanding. Tolerated treatment well. Patient requires continued skilled PT per plan of care to address remaining impairments and improve function in home and community.         Plan: Continue per plan of care.  Progress treatment as tolerated.       Precautions: previous history of seizures (none recently).      Manuals            Infraspinatus STM/TPR ASIF ASIF           R GH jt p-a mob gr 1-3 ASIF ASIF           R shld PROM MWM Asif ASIF                        Neuro Re-Ed                                       Scap retraction 10x5\" 10x5\"           ER w/ retraction  nv           Shld ext w/retraction  2x10 rtb                                     Ther Ex             Pt edu  HEP, pillow positioning at night           Pendulums a-p, m-l  10 ea           Supine flexion PROM with other arm 5x10\" 5x10\"           Table slides AROM 10x5\" 10x5\"           Pulleys flex  5x10\"           Pulleys abd  5x10\"           Cross body adduction  5x10\"           Internal rotation stretch w/strap  5x10\"           Wand flex AAROM STANDING  10x5\"           Wand " "abd SELENE  10x5\"             Ther Activity                                       Gait Training                                       Modalities             Cold pack end 8' 8'                             "

## 2024-05-06 ENCOUNTER — OFFICE VISIT (OUTPATIENT)
Dept: PHYSICAL THERAPY | Facility: CLINIC | Age: 24
End: 2024-05-06
Payer: COMMERCIAL

## 2024-05-06 ENCOUNTER — TELEPHONE (OUTPATIENT)
Dept: GASTROENTEROLOGY | Facility: CLINIC | Age: 24
End: 2024-05-06

## 2024-05-06 DIAGNOSIS — M25.511 ACUTE PAIN OF RIGHT SHOULDER: Primary | ICD-10-CM

## 2024-05-06 PROCEDURE — 97140 MANUAL THERAPY 1/> REGIONS: CPT | Performed by: PHYSICAL THERAPIST

## 2024-05-06 PROCEDURE — 97110 THERAPEUTIC EXERCISES: CPT | Performed by: PHYSICAL THERAPIST

## 2024-05-06 PROCEDURE — 97010 HOT OR COLD PACKS THERAPY: CPT | Performed by: PHYSICAL THERAPIST

## 2024-05-06 NOTE — TELEPHONE ENCOUNTER
I called and lvm for the patient that their appointment with Dr. 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 I advised that we have afternoon time available to move the morning patients.

## 2024-05-06 NOTE — PROGRESS NOTES
"Daily Note     Today's date: 2024  Patient name: Riana Conn  : 2000  MRN: [id number]  Referring provider: Mk Cuello, *  Dx:   Encounter Diagnosis     ICD-10-CM    1. Acute pain of right shoulder  M25.511                      Subjective: patient reports a not as much sore after last session compared to post evaluation. She feels that her shoulder is improving and she is able to move it a little better.       Objective: See treatment diary below    135 deg. Flexion AROM    Assessment: Patient presented with less irritability today with ROM exercises and manual therapy. She required fewer rest breaks and had less soreness post exercises. Improved flexion AROM seen. Tolerated treatment well. Patient requires continued skilled PT per plan of care to address remaining impairments and improve function in home and community.         Plan: Continue per plan of care.  Progress treatment as tolerated.       Precautions: previous history of seizures (none recently).      Manuals  5          Infraspinatus STM/TPR ASIF GOLD JP          R GH jt p-a mob gr 1-3 ASIF GOLD JP          R shld PROM MWM Asif GOLD JP                       Neuro Re-Ed                                       Scap retraction 10x5\" 10x5\" 10x5\"          ER w/ retraction  nv 2x10 rtb          Shld ext w/retraction  2x10 rtb 3x10 rtb          Rows w/ retraction                          Ther Ex             Pt edu  HEP, pillow positioning at night           Pendulums a-p, m-l  10 ea 10 ea          Supine flexion PROM with other arm 5x10\" 5x10\" 10x5\"          Table slides AROM 10x5\" 10x5\" Wall slides 10x5\"          Pulleys flex  5x10\" 5x10\"          Pulleys abd  5x10\" 5x10\"          Cross body adduction  5x10\" 5x10\"          Internal rotation stretch w/strap  5x10\" 10x5\"          Wand flex AAROM STANDING  10x5\" 10x5\"          Wand abd AAROM  10x5\"   10x5\"          Ther Activity                                       Gait Training           "                             Modalities             Cold pack end 8' 8' 8'

## 2024-05-08 ENCOUNTER — OFFICE VISIT (OUTPATIENT)
Dept: PHYSICAL THERAPY | Facility: CLINIC | Age: 24
End: 2024-05-08
Payer: COMMERCIAL

## 2024-05-08 DIAGNOSIS — M25.511 ACUTE PAIN OF RIGHT SHOULDER: Primary | ICD-10-CM

## 2024-05-08 PROCEDURE — 97110 THERAPEUTIC EXERCISES: CPT | Performed by: PHYSICAL THERAPIST

## 2024-05-08 PROCEDURE — 97112 NEUROMUSCULAR REEDUCATION: CPT | Performed by: PHYSICAL THERAPIST

## 2024-05-08 PROCEDURE — 97140 MANUAL THERAPY 1/> REGIONS: CPT | Performed by: PHYSICAL THERAPIST

## 2024-05-08 NOTE — PROGRESS NOTES
"Daily Note     Today's date: 2024  Patient name: Riana Conn  : 2000  MRN: [id number]  Referring provider: Mk Cuello, *  Dx:   Encounter Diagnosis     ICD-10-CM    1. 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       Objective: See treatment diary below      Assessment: Tolerated treatment well. Patient demonstrated fatigue post treatment and exhibited good technique with therapeutic exercises. Added in eccentric lowering with emphasis on scapular control and remaining pain free during completion.   No significant increase in pain reported at end of session, adjusted home routine to include lowering from above head position.    Plan: Continue per plan of care.      Precautions: previous history of seizures (none recently).      Manuals          Infraspinatus STM/TPR ASIF GOLD JP RN         R GH jt p-a mob gr 1-3 ASIF GOLD JP RN         R shld PROM MWM Asif GOLD JP RN                      Neuro Re-Ed                                       Scap retraction 10x5\" 10x5\" 10x5\" 10x5''         ER w/ retraction  nv 2x10 rtb 2x10 rtb         Shld ext w/retraction  2x10 rtb 3x10 rtb 3x10 rtb         Rows w/ retraction    2x10 rtb         Wall slide flexion w/ eccentric lowering    X10 pain free range         Ther Ex             Pt edu  HEP, pillow positioning at night           Pendulums a-p, m-l  10 ea 10 ea 10 ea         Supine flexion PROM with other arm 5x10\" 5x10\" 10x5\" 10x5''         Table slides AROM 10x5\" 10x5\" Wall slides 10x5\" Wall slides 10x5\"         Pulleys flex  5x10\" 5x10\" 5x10''         Pulleys abd  5x10\" 5x10\" 5x10''         Cross body adduction  5x10\" 5x10\" 5x10''         Internal rotation stretch w/strap  5x10\" 10x5\" 10x5''         Wand flex AAROM STANDING  10x5\" 10x5\" 10x5'' " "        Wand abd AAROM  10x5\"   10x5\" 10x5''         Ther Activity                                       Gait Training                                       Modalities             Cold pack end 8' 8' 8' 8'                             "

## 2024-05-13 ENCOUNTER — OFFICE VISIT (OUTPATIENT)
Dept: PHYSICAL THERAPY | Facility: CLINIC | Age: 24
End: 2024-05-13
Payer: COMMERCIAL

## 2024-05-13 DIAGNOSIS — M25.511 ACUTE PAIN OF RIGHT SHOULDER: Primary | ICD-10-CM

## 2024-05-13 PROCEDURE — 97010 HOT OR COLD PACKS THERAPY: CPT | Performed by: PHYSICAL THERAPIST

## 2024-05-13 PROCEDURE — 97112 NEUROMUSCULAR REEDUCATION: CPT | Performed by: PHYSICAL THERAPIST

## 2024-05-13 PROCEDURE — 97140 MANUAL THERAPY 1/> REGIONS: CPT | Performed by: PHYSICAL THERAPIST

## 2024-05-13 NOTE — PROGRESS NOTES
"Daily Note     Today's date: 2024  Patient name: Riana Conn  : 2000  MRN: [id number]  Referring provider: Mk Cuello, *  Dx:   Encounter Diagnosis     ICD-10-CM    1. Acute pain of right shoulder  M25.511                      Subjective: Patient reports that her shoulder is okay, still gets sore.      Objective: See treatment diary below    R shld Flex 150 degrees  L shld flex 165 degrees    Assessment: Tolerated treatment well. Patient demonstrated significant improvement in shoulder flexion AROM, but still not equal to uninjured side. Progressed shoulder girdle strengthening/stabilization with resisted IR with TB and no money (b/l ER with retraction). Patient is progressing well with therapy and would benefit from continued skilled PT per plan of care.       Plan: Continue per plan of care.      Precautions: previous history of seizures (none recently).      Manuals         Infraspinatus STM/TPR ASIF GOLD JP RN         R GH jt p-a mob gr 1-3 ASIF GOLD JP RN ASIF        R shld PROM MWM Asif OGLD JP RN ASIF                     Neuro Re-Ed             No money     2x10 rtb        IR w/ retraction     2x15 rtb        Scap retraction 10x5\" 10x5\" 10x5\" 10x5'' D/c        ER w/ retraction  nv 2x10 rtb 2x10 rtb 2x15 rtb        Shld ext w/retraction  2x10 rtb 3x10 rtb 3x10 rtb 2x15 rtb        Rows w/ retraction    2x10 rtb 2x15 gtb        Wall slide flexion w/ eccentric lowering    X10 pain free range         Ther Ex             Resisted shld flex     10x rtb        Resisted shld abd     10x rtb        Pt edu  HEP, pillow positioning at night           Pendulums a-p, m-l  10 ea 10 ea 10 ea 10 ea        Supine flexion PROM with other arm 5x10\" 5x10\" 10x5\" 10x5''         Table slides AROM 10x5\" 10x5\" Wall slides 10x5\" Wall slides 10x5\" Wall slides 10x5\"        Pulleys flex  5x10\" 5x10\" 5x10'' 5x10''        Pulleys abd  5x10\" 5x10\" 5x10'' 5x10''        Cross body adduction  5x10\" 5x10\" " "5x10'' 5x10\"        Internal rotation stretch w/strap  5x10\" 10x5\" 10x5'' 10x5\"          Shan flex AAROM STANDING  10x5\" 10x5\" 10x5'' 10x5\"          Shan abd AAROM  10x5\"   10x5\" 10x5'' 10x5\"          Ther Activity                                       Gait Training                                       Modalities             Cold pack end 8' 8' 8' 8' 8'                            "

## 2024-05-15 ENCOUNTER — OFFICE VISIT (OUTPATIENT)
Dept: PHYSICAL THERAPY | Facility: CLINIC | Age: 24
End: 2024-05-15
Payer: COMMERCIAL

## 2024-05-15 DIAGNOSIS — M25.511 ACUTE PAIN OF RIGHT SHOULDER: Primary | ICD-10-CM

## 2024-05-15 PROCEDURE — 97110 THERAPEUTIC EXERCISES: CPT | Performed by: PHYSICAL THERAPIST

## 2024-05-15 PROCEDURE — 97140 MANUAL THERAPY 1/> REGIONS: CPT | Performed by: PHYSICAL THERAPIST

## 2024-05-15 PROCEDURE — 97112 NEUROMUSCULAR REEDUCATION: CPT | Performed by: PHYSICAL THERAPIST

## 2024-05-15 NOTE — PROGRESS NOTES
"Daily Note     Today's date: 5/15/2024  Patient name: Riana Conn  : 2000  MRN: [id number]  Referring provider: Mk Cuello, *  Dx:   Encounter Diagnosis     ICD-10-CM    1. 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       Objective: See treatment diary below      Assessment: Tolerated treatment well. Patient demonstrated fatigue post treatment. Despite the soreness, she was able to complete all TE with increases in resistance during band strengthening, fatigue only upon completion. Wall snow angels provided challenge and tightness in the top of the right shoulder.      Plan: Continue per plan of care.      Precautions: previous history of seizures (none recently).      Manuals  515       Infraspinatus STM/TPR ASIF GOLD JP RN         R GH jt p-a mob gr 1-3 ASIF GOLD JP RN ASIF RN       R shld PROM MWM Asif GOLD JP RN ASIF RN                    Neuro Re-Ed             No money     2x10 rtb 2x10 gtb       IR w/ retraction     2x15 rtb 2x15 gtb       Scap retraction 10x5\" 10x5\" 10x5\" 10x5'' D/c        ER w/ retraction  nv 2x10 rtb 2x10 rtb 2x15 rtb 2x15 gtb       Shld ext w/retraction  2x10 rtb 3x10 rtb 3x10 rtb 2x15 rtb 2x15 gtb       Rows w/ retraction    2x10 rtb 2x15 gtb 2x15 gtb       Wall slide flexion w/ eccentric lowering    X10 pain free range         Snow angels at wall      P!       Ther Ex             Resisted shld flex     10x rtb 10x rtb       Resisted shld abd     10x rtb 10x rtb       Pt edu  HEP, pillow positioning at night           Pendulums a-p, m-l  10 ea 10 ea 10 ea 10 ea 10 ea       Supine flexion PROM with other arm 5x10\" 5x10\" 10x5\" 10x5''         Table slides AROM 10x5\" 10x5\" Wall slides 10x5\" Wall slides 10x5\" Wall slides 10x5\" Wall slides 10x5\"       Pulleys " "flex  5x10\" 5x10\" 5x10'' 5x10'' 5x10''       Pulleys abd  5x10\" 5x10\" 5x10'' 5x10'' 5x10''       Cross body adduction  5x10\" 5x10\" 5x10'' 5x10\" 5x10''       Internal rotation stretch w/strap  5x10\" 10x5\" 10x5'' 10x5\"   10x5''       Wand flex AAROM STANDING  10x5\" 10x5\" 10x5'' 10x5\"   10x5''       Wand abd AAROM  10x5\"   10x5\" 10x5'' 10x5\"   10x5''       Ther Activity                                       Gait Training                                       Modalities             Cold pack end 8' 8' 8' 8' 8' 8'                             "

## 2024-05-22 ENCOUNTER — OFFICE VISIT (OUTPATIENT)
Dept: PHYSICAL THERAPY | Facility: CLINIC | Age: 24
End: 2024-05-22
Payer: COMMERCIAL

## 2024-05-22 DIAGNOSIS — M25.511 ACUTE PAIN OF RIGHT SHOULDER: Primary | ICD-10-CM

## 2024-05-22 PROCEDURE — 97010 HOT OR COLD PACKS THERAPY: CPT | Performed by: PHYSICAL THERAPIST

## 2024-05-22 PROCEDURE — 97110 THERAPEUTIC EXERCISES: CPT | Performed by: PHYSICAL THERAPIST

## 2024-05-22 PROCEDURE — 97112 NEUROMUSCULAR REEDUCATION: CPT | Performed by: PHYSICAL THERAPIST

## 2024-05-22 PROCEDURE — 97140 MANUAL THERAPY 1/> REGIONS: CPT | Performed by: PHYSICAL THERAPIST

## 2024-05-22 NOTE — PROGRESS NOTES
"Daily Note     Today's date: 2024  Patient name: Riana Conn  : 2000  MRN: [id number]  Referring provider: Mk Cuello, *  Dx:   Encounter Diagnosis     ICD-10-CM    1. 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 She gets mild pain during and after. She also has returned to recreational weight training. She feels continued improvements however.      Objective: See treatment diary below    R shoulder AROM  158 flexion  160 abduction      Assessment: Tolerated treatment well. Patient demonstrated significant improvement in shoulder AROM. Spent increased time with passive stretching and joint mobilizations to improve end ROM for flexion and abduction. Educatient patient on priority exercises at home being wall slide flexion and abduction. Also instructed patient that she can begin return to weight training but avoid overhead lifts still, and have caution with pressing movements. She verbalized understanding. Patient requires continued skilled PT per plan of care to address remaining impairments and improve function in home and community.         Plan: Continue per plan of care.      Precautions: previous history of seizures (none recently).      Manuals 4/30 5/2 5/6 5/8 5/13 5/15 5/22      Infraspinatus STM/TPR ASIF GOLD JP RN         R GH jt p-a mob gr 1-3 ASIF GOLD JP RN ASIF RN ASIF and inferior      R shld PROM MWM Asif GOLD JP RN ASIF RN ASIF                   Neuro Re-Ed             No money     2x10 rtb 2x10 gtb       IR w/ retraction     2x15 rtb 2x15 gtb 2x15 gtb      Scap retraction 10x5\" 10x5\" 10x5\" 10x5'' D/c        ER w/ retraction  nv 2x10 rtb 2x10 rtb 2x15 rtb 2x15 gtb 2x15 gtb      Shld ext w/retraction  2x10 rtb 3x10 rtb 3x10 rtb 2x15 rtb 2x15 gtb 3x10 gtb      Rows w/ retraction    2x10 rtb 2x15 gtb 2x15 gtb 3x10 stb      Wall slide flexion w/ eccentric lowering    X10 pain " "free range         Snow angels at wall      P!       Ther Ex             Resisted shld flex     10x rtb 10x rtb       Resisted shld abd     10x rtb 10x rtb       Pt edu  HEP, pillow positioning at night     3' return to weight training                   Supine flexion PROM with other arm 5x10\" 5x10\" 10x5\" 10x5''         Wall slide abd       5x10\"      Table slides AROM 10x5\" 10x5\" Wall slides 10x5\" Wall slides 10x5\" Wall slides 10x5\" Wall slides 10x5\" 5x10\"      Pulleys flex  5x10\" 5x10\" 5x10'' 5x10'' 5x10'' 5x10\"      Pulleys abd  5x10\" 5x10\" 5x10'' 5x10'' 5x10'' 5x10\"      Cross body adduction  5x10\" 5x10\" 5x10'' 5x10\" 5x10''       Internal rotation stretch w/strap  5x10\" 10x5\" 10x5'' 10x5\"   10x5'' 10x5\"        Wand flex AAROM STANDING  10x5\" 10x5\" 10x5'' 10x5\"   10x5'' 10x5\"        Wand abd AAROM  10x5\"   10x5\" 10x5'' 10x5\"   10x5'' 10x5\"        Ther Activity                                       Gait Training                                       Modalities             Cold pack end 8' 8' 8' 8' 8' 8' 8'                            "

## 2024-05-23 ENCOUNTER — OFFICE VISIT (OUTPATIENT)
Dept: PHYSICAL THERAPY | Facility: CLINIC | Age: 24
End: 2024-05-23
Payer: COMMERCIAL

## 2024-05-23 DIAGNOSIS — M25.511 ACUTE PAIN OF RIGHT SHOULDER: Primary | ICD-10-CM

## 2024-05-23 PROCEDURE — 97110 THERAPEUTIC EXERCISES: CPT | Performed by: PHYSICAL THERAPIST

## 2024-05-23 PROCEDURE — 97112 NEUROMUSCULAR REEDUCATION: CPT | Performed by: PHYSICAL THERAPIST

## 2024-05-23 PROCEDURE — 97140 MANUAL THERAPY 1/> REGIONS: CPT | Performed by: PHYSICAL THERAPIST

## 2024-05-23 NOTE — PROGRESS NOTES
"Daily Note     Today's date: 2024  Patient name: Riana Conn  : 2000  MRN: [id number]  Referring provider: Mk Cuello, *  Dx:   Encounter Diagnosis     ICD-10-CM    1. Acute pain of right shoulder  M25.511                      Subjective: Patient reports that her shoulder is feeling good today.       Objective: See treatment diary below      Assessment: Tolerated treatment well. Progressed shoulder stabilization training with addition of body blade IR/ER and abduction. End range restriction with flexion and abduction but no pain. Patient requires continued skilled PT per plan of care to address remaining impairments and improve function in home and community.         Plan: Continue per plan of care.      Precautions: previous history of seizures (none recently).      Manuals 4/30 5/2 5/6 5/8 5/13 5/15 5/22 5/23     Infraspinatus STM/TPR ASIF ASIF ASIF RN         R GH jt p-a mob gr 1-3 ASIF ASIF ASIF RN ASIF RN ASIF and inferior ASIF and inferior     R shld PROM MWM Asif ASIF ASIF RN ASIF RN ASIF ASIF                  Neuro Re-Ed             No money     2x10 rtb 2x10 gtb  2x15 gtb     Body blade IR/ER        3x30\"     Body blade abd        3x30\"     Prone Is        10x5\"     Prone Ts        10x5\"     IR w/ retraction     2x15 rtb 2x15 gtb 2x15 gtb      Scap retraction 10x5\" 10x5\" 10x5\" 10x5'' D/c        ER w/ retraction  nv 2x10 rtb 2x10 rtb 2x15 rtb 2x15 gtb 2x15 gtb      Shld ext w/retraction  2x10 rtb 3x10 rtb 3x10 rtb 2x15 rtb 2x15 gtb 3x10 gtb      Rows w/ retraction    2x10 rtb 2x15 gtb 2x15 gtb 3x10 stb 2x10 35#     Wall slide flexion w/ eccentric lowering    X10 pain free range    10x     Snow angels at wall      P!       Ther Ex             Resisted shld flex     10x rtb 10x rtb  10x gtb     Resisted shld scap        10x gtb     Resisted shld abd     10x rtb 10x rtb  10x gtb     D1 ext        2x10 gtb     D2 flex        2x10 gtb     Pt edu  HEP, pillow positioning at night     3' return to weight training   " "   Horizontal abd        2x10 gtb     Supine flexion PROM with other arm 5x10\" 5x10\" 10x5\" 10x5''         Wall slide abd       5x10\" 5x10\"     Table slides AROM 10x5\" 10x5\" Wall slides 10x5\" Wall slides 10x5\" Wall slides 10x5\" Wall slides 10x5\" 5x10\"      Pulleys flex  5x10\" 5x10\" 5x10'' 5x10'' 5x10'' 5x10\"      Pulleys abd  5x10\" 5x10\" 5x10'' 5x10'' 5x10'' 5x10\"      Cross body adduction  5x10\" 5x10\" 5x10'' 5x10\" 5x10''       Internal rotation stretch w/strap  5x10\" 10x5\" 10x5'' 10x5\"   10x5'' 10x5\"   10x5\"     Wand flex AAROM STANDING  10x5\" 10x5\" 10x5'' 10x5\"   10x5'' 10x5\"        Wand abd AAROM  10x5\"   10x5\" 10x5'' 10x5\"   10x5'' 10x5\"        Ther Activity                                       Gait Training                                       Modalities             Cold pack end 8' 8' 8' 8' 8' 8' 8'                            "

## 2024-05-29 ENCOUNTER — APPOINTMENT (OUTPATIENT)
Dept: PHYSICAL THERAPY | Facility: CLINIC | Age: 24
End: 2024-05-29
Payer: COMMERCIAL

## 2024-05-30 ENCOUNTER — OFFICE VISIT (OUTPATIENT)
Dept: PHYSICAL THERAPY | Facility: CLINIC | Age: 24
End: 2024-05-30
Payer: COMMERCIAL

## 2024-05-30 DIAGNOSIS — M25.511 ACUTE PAIN OF RIGHT SHOULDER: Primary | ICD-10-CM

## 2024-05-30 PROCEDURE — 97112 NEUROMUSCULAR REEDUCATION: CPT | Performed by: PHYSICAL THERAPIST

## 2024-05-30 PROCEDURE — 97140 MANUAL THERAPY 1/> REGIONS: CPT | Performed by: PHYSICAL THERAPIST

## 2024-05-30 PROCEDURE — 97110 THERAPEUTIC EXERCISES: CPT | Performed by: PHYSICAL THERAPIST

## 2024-05-30 NOTE — PROGRESS NOTES
"Daily Note     Today's date: 2024  Patient name: Riana Conn  : 2000  MRN: [id number]  Referring provider: Mk Cuello, *  Dx:   Encounter Diagnosis     ICD-10-CM    1. Acute pain of right shoulder  M25.511                      Subjective: Patient reports that she tried a workout in the gym and felt good. She had muscle soreness but no shoulder soreness. 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      Objective: See treatment diary below    162 deg. Flex PROM    MMT shld IR/ER     Assessment: Tolerated treatment well. Patient presents with very low irritability today. Progressed strengthening for full return to recreational exercise with addition of face pulls and 2 sets of wall push ups. Patient continues to have shoulder tightness with flexion and abduction, but is approaching that of her other shoulder. Patient requires continued skilled PT per plan of care to address remaining impairments and improve function in home and community.         Plan: Continue per plan of care.  Transition to 1x per week.     Precautions: previous history of seizures (none recently).      Manuals 4/30 5/2 5/6 5/8 5/13 5/15 5/22 5/23 5/30    Infraspinatus STM/TPR ASIF ASIF ASIF RN         R GH jt p-a mob gr 1-3 ASIF ASIF GOLD RN ASIF RN ASIF and inferior ASIF and inferior ASIF inferior and LAD gr 3-4    R shld PROM MWM Asif GOLD JP RN ASIF RN ASIF GOLD ASIF                 Neuro Re-Ed             No money     2x10 rtb 2x10 gtb  2x15 gtb     Body blade IR/ER        3x30\" 3x30\"      Body blade diagonal          2x30\"    Body blade abd        3x30\" 3x30\"      Prone Is        10x5\"     Prone Ts        10x5\"     IR w/ retraction     2x15 rtb 2x15 gtb 2x15 gtb      Face pulls         2x10 15-20#    ER w/ retraction  nv 2x10 rtb 2x10 rtb 2x15 rtb 2x15 gtb 2x15 gtb      Shld ext w/retraction  2x10 rtb 3x10 rtb 3x10 rtb 2x15 rtb 2x15 gtb 3x10 gtb      Rows w/ retraction    2x10 " "rtb 2x15 gtb 2x15 gtb 3x10 stb 2x10 35#     Wall slide flexion w/ eccentric lowering    X10 pain free range    10x     Snow angels at wall      P!       Ther Ex             Resisted shld flex     10x rtb 10x rtb  10x gtb     Resisted shld scap        10x gtb     Resisted shld abd     10x rtb 10x rtb  10x gtb     D1 ext        2x10 gtb 2x10 bltb    D2 flex        2x10 gtb 2x10 bltb    Pt edu  HEP, pillow positioning at night     3' return to weight training      Horizontal abd        2x10 gtb 2x10 gtb    Kneeling lat stretch         3x30\"    Wall slide abd       5x10\" 5x10\" 10x    Table slides AROM 10x5\" 10x5\" Wall slides 10x5\" Wall slides 10x5\" Wall slides 10x5\" Wall slides 10x5\" 5x10\"  10x flex    Pulleys flex  5x10\" 5x10\" 5x10'' 5x10'' 5x10'' 5x10\"      Pulleys abd  5x10\" 5x10\" 5x10'' 5x10'' 5x10'' 5x10\"      Doorway pec stretch         3x20\"    Internal rotation stretch w/strap  5x10\" 10x5\" 10x5'' 10x5\"   10x5'' 10x5\"   10x5\" 10x5\"      UBE for strength         2'/2' lvl 4                 Ther Activity                                       Gait Training                                       Modalities             Cold pack end 8' 8' 8' 8' 8' 8' 8'                            "

## 2024-06-03 ENCOUNTER — APPOINTMENT (OUTPATIENT)
Dept: PHYSICAL THERAPY | Facility: CLINIC | Age: 24
End: 2024-06-03
Payer: COMMERCIAL

## 2024-06-05 ENCOUNTER — OFFICE VISIT (OUTPATIENT)
Dept: PHYSICAL THERAPY | Facility: CLINIC | Age: 24
End: 2024-06-05
Payer: COMMERCIAL

## 2024-06-05 DIAGNOSIS — M25.511 ACUTE PAIN OF RIGHT SHOULDER: Primary | ICD-10-CM

## 2024-06-05 PROCEDURE — 97140 MANUAL THERAPY 1/> REGIONS: CPT | Performed by: PHYSICAL THERAPIST

## 2024-06-05 PROCEDURE — 97110 THERAPEUTIC EXERCISES: CPT | Performed by: PHYSICAL THERAPIST

## 2024-06-05 PROCEDURE — 97112 NEUROMUSCULAR REEDUCATION: CPT | Performed by: PHYSICAL THERAPIST

## 2024-06-05 NOTE — PROGRESS NOTES
"Daily Note     Today's date: 2024  Patient name: Riana Conn  : 2000  MRN: [id number]  Referring provider: Mk Cuello, *  Dx:   Encounter Diagnosis     ICD-10-CM    1. Acute pain of right shoulder  M25.511                      Subjective: Patient reports no pain in the past week. She feels that her shoulder is 100%, no limitations. She feels comfortable with discharge to home program.      Objective: See treatment diary below    170 deg. Flex PROM  165 deg. Abd PROM    MMT shld flexion 5/5 b/l  MMT shld abduction 5/5 b/l  MMT mid trap 4+/5  b/l  MMT low trap 4+/5  b/l    Assessment: Tolerated treatment well. Educated patient on updated home program and discharge recommendations; she verbalized understanding to all education.  At this time, patient is appropriate for discharge to home program to maintain improvements and further progress upon impairments.      Plan: Discharge to home program.     Precautions: previous history of seizures (none recently).      Manuals 4/30 5/2 5/6 5/8 5/13 5/15 5/22 5/23 5/30 6/5   Infraspinatus STM/TPR ASIF GOLD JP RN         R GH jt p-a mob gr 1-3 ASIF ASIF GOLD RN ASIF RN ASIF and inferior ASIF and inferior ASIF inferior and LAD gr 3-4    R shld PROM MWM Asif GOLD JP RN ASIF RN ASIF GOLD JP, JP                Neuro Re-Ed             No money     2x10 rtb 2x10 gtb  2x15 gtb  2x10 gtb   Body blade IR/ER        3x30\" 3x30\"   3x30\"     Body blade diagonal          2x30\" 3x30\"   Body blade abd        3x30\" 3x30\"      Prone Is        10x5\"  10x5\"   Prone Ts        10x5\"  10x5\"   IR w/ retraction     2x15 rtb 2x15 gtb 2x15 gtb      Face pulls         2x10 15-20# 2x10 25#   ER w/ retraction  nv 2x10 rtb 2x10 rtb 2x15 rtb 2x15 gtb 2x15 gtb      Shld ext w/retraction  2x10 rtb 3x10 rtb 3x10 rtb 2x15 rtb 2x15 gtb 3x10 gtb      Rows w/ retraction    2x10 rtb 2x15 gtb 2x15 gtb 3x10 stb 2x10 35#     Wall slide flexion w/ eccentric lowering    X10 pain free range    10x     Snow angels at wall    " "  P!       Ther Ex             Resisted shld flex     10x rtb 10x rtb  10x gtb  10x gtb   Resisted shld scap        10x gtb  10x gtb   Resisted shld abd     10x rtb 10x rtb  10x gtb  10x gtb   D1 ext        2x10 gtb 2x10 bltb    D2 flex        2x10 gtb 2x10 bltb    Pt edu  HEP, pillow positioning at night     3' return to weight training   3' d/c recommendations HEP   Horizontal abd        2x10 gtb 2x10 gtb 2x10 gtb   Kneeling lat stretch         3x30\" 3x30\"     Wall slide abd       5x10\" 5x10\" 10x    Table slides AROM 10x5\" 10x5\" Wall slides 10x5\" Wall slides 10x5\" Wall slides 10x5\" Wall slides 10x5\" 5x10\"  10x flex    Pulleys flex  5x10\" 5x10\" 5x10'' 5x10'' 5x10'' 5x10\"      Pulleys abd  5x10\" 5x10\" 5x10'' 5x10'' 5x10'' 5x10\"      Doorway pec stretch         3x20\" 3x30\"     Internal rotation stretch w/strap  5x10\" 10x5\" 10x5'' 10x5\"   10x5'' 10x5\"   10x5\" 10x5\"   10x5\"     UBE for strength         2'/2' lvl 4 2'/2' lvl 4                Ther Activity                                       Gait Training                                       Modalities             Cold pack end 8' 8' 8' 8' 8' 8' 8'                            "

## 2024-06-10 ENCOUNTER — APPOINTMENT (OUTPATIENT)
Dept: PHYSICAL THERAPY | Facility: CLINIC | Age: 24
End: 2024-06-10
Payer: COMMERCIAL

## 2024-06-12 ENCOUNTER — APPOINTMENT (OUTPATIENT)
Dept: PHYSICAL THERAPY | Facility: CLINIC | Age: 24
End: 2024-06-12
Payer: COMMERCIAL

## 2024-06-12 DIAGNOSIS — R11.0 NAUSEA: ICD-10-CM

## 2024-06-12 RX ORDER — OMEPRAZOLE 20 MG/1
20 CAPSULE, DELAYED RELEASE ORAL DAILY
Qty: 90 CAPSULE | Refills: 1 | Status: SHIPPED | OUTPATIENT
Start: 2024-06-12

## 2024-06-28 ENCOUNTER — HOSPITAL ENCOUNTER (EMERGENCY)
Facility: HOSPITAL | Age: 24
Discharge: HOME | End: 2024-06-28
Attending: EMERGENCY MEDICINE
Payer: COMMERCIAL

## 2024-06-28 VITALS
DIASTOLIC BLOOD PRESSURE: 97 MMHG | HEART RATE: 81 BPM | SYSTOLIC BLOOD PRESSURE: 162 MMHG | TEMPERATURE: 98.7 F | OXYGEN SATURATION: 98 % | RESPIRATION RATE: 18 BRPM

## 2024-06-28 DIAGNOSIS — B08.4 HAND, FOOT AND MOUTH DISEASE: Primary | ICD-10-CM

## 2024-06-28 LAB — S PYO DNA THROAT QL NAA+PROBE: NOT DETECTED

## 2024-06-28 PROCEDURE — 99284 EMERGENCY DEPT VISIT MOD MDM: CPT

## 2024-06-28 PROCEDURE — 87651 STREP A DNA AMP PROBE: CPT | Performed by: PHYSICIAN ASSISTANT

## 2024-06-28 PROCEDURE — 99284 EMERGENCY DEPT VISIT MOD MDM: CPT | Performed by: PHYSICIAN ASSISTANT

## 2024-06-28 RX ORDER — ACETAMINOPHEN 325 MG/1
650 TABLET ORAL ONCE
Status: COMPLETED | OUTPATIENT
Start: 2024-06-28 | End: 2024-06-28

## 2024-06-28 RX ADMIN — ACETAMINOPHEN 650 MG: 325 TABLET, FILM COATED ORAL at 20:14

## 2024-06-28 NOTE — ED PROVIDER NOTES
History  Chief Complaint   Patient presents with    Medical Problem     Pt states she felt warm this thursday, did not take temp at home, took tylenol.  Now c/o burning sensation on fingertips.  Pt states n/v with x1 vomiting episode on Thursday.  Returned from [place] last Saturday/.     Patient is a 25 y/o F that presents to the ED with painful rash to palms of both hands that started yesterday.  She states she is now developing it on right foot and has a sore throat.  She denies sick contacts, but recently returned from her honeymoon in [place].  SHe states she has nausea and vomited once.        History provided by:  Patient  Medical Problem  Associated symptoms: fever, nausea, rash, sore throat and vomiting    Associated symptoms: no cough and no shortness of breath        Prior to Admission Medications   Prescriptions Last Dose Informant Patient Reported? Taking?   Lactobacillus (PROBIOTIC ACIDOPHILUS PO)  Self Yes No   Sig: Take by mouth   Patient not taking: Reported on 2024   PREBIOTIC PRODUCT PO  Self Yes No   Sig: Take by mouth   Patient not taking: Reported on 2024   benzonatate (TESSALON PERLES) 100 mg capsule   No No   Sig: Take 2 capsules (200 mg total) by mouth 3 (three) times a day as needed for cough   Patient not taking: Reported on 2024   fluticasone (FLONASE) 50 mcg/act nasal spray   No No   Si sprays into each nostril daily   methylPREDNISolone 4 MG tablet therapy pack   No No   Sig: Use as directed on package   Patient not taking: Reported on 2024   omeprazole (PriLOSEC) 20 mg delayed release capsule   No No   Sig: TAKE 1 CAPSULE BY MOUTH EVERY DAY   ondansetron (ZOFRAN) 4 mg tablet  Self No No   Sig: Take 1 tablet (4 mg total) by mouth every 8 (eight) hours as needed for nausea or vomiting   Patient not taking: Reported on 2024   predniSONE 10 mg tablet  Self No No   Sig: Take 4 tablets orally for 3 days,  Then 3 tablets for 3 days, then 2 tablets for 3 days, then 1  tablet for 3 days.      Facility-Administered Medications: None       Past Medical History:   Diagnosis Date    Celiac disease     Seizure (HCC)     No seizures in 10-12 years per patient       Past Surgical History:   Procedure Laterality Date    WISDOM TOOTH EXTRACTION         Family History   Problem Relation Age of Onset    Diabetes Maternal Grandfather     Seizures Neg Hx      I have reviewed and agree with the history as documented.    E-Cigarette/Vaping    E-Cigarette Use Never User      E-Cigarette/Vaping Substances    Nicotine No     THC No     CBD No     Flavoring No     Other No     Unknown No      Social History     Tobacco Use    Smoking status: Never    Smokeless tobacco: Never   Vaping Use    Vaping status: Never Used   Substance Use Topics    Alcohol use: Yes     Comment: socially    Drug use: Never       Review of Systems   Constitutional:  Positive for fever.   HENT:  Positive for sore throat.    Respiratory:  Negative for cough and shortness of breath.    Gastrointestinal:  Positive for nausea and vomiting.   Skin:  Positive for rash.   Neurological:  Negative for dizziness, weakness, light-headedness and numbness.   All other systems reviewed and are negative.      Physical Exam  Physical Exam  Vitals and nursing note reviewed.   Constitutional:       General: She is not in acute distress.     Appearance: Normal appearance. She is well-developed and well-groomed. She is not ill-appearing or diaphoretic.   HENT:      Head: Normocephalic and atraumatic.      Right Ear: Hearing normal.      Left Ear: Hearing normal.      Nose: Nose normal.      Mouth/Throat:      Mouth: Mucous membranes are moist.      Pharynx: Posterior oropharyngeal erythema present. No pharyngeal swelling.      Comments: Red blisters to hard palate and lower lip  Eyes:      Conjunctiva/sclera: Conjunctivae normal.   Cardiovascular:      Rate and Rhythm: Normal rate and regular rhythm.      Heart sounds: Normal heart sounds.    Pulmonary:      Effort: Pulmonary effort is normal.      Breath sounds: Normal breath sounds. No wheezing, rhonchi or rales.   Musculoskeletal:         General: Normal range of motion.      Cervical back: Normal range of motion and neck supple.   Lymphadenopathy:      Cervical: No cervical adenopathy.   Skin:     General: Skin is warm and dry.      Findings: Rash (red blisters to b/l palms of hands and 1 on right foot.) present.   Neurological:      Mental Status: She is alert and oriented to person, place, and time.      Sensory: Sensation is intact.      Motor: Motor function is intact.   Psychiatric:         Behavior: Behavior is cooperative.         Vital Signs  ED Triage Vitals [06/28/24 1936]   Temperature Pulse Respirations Blood Pressure SpO2   98.7 °F (37.1 °C) 81 18 162/97 98 %      Temp Source Heart Rate Source Patient Position - Orthostatic VS BP Location FiO2 (%)   Oral Monitor -- Right arm --      Pain Score       4           Vitals:    06/28/24 1936   BP: 162/97   Pulse: 81         Visual Acuity      ED Medications  Medications   acetaminophen (TYLENOL) tablet 650 mg (650 mg Oral Given 6/28/24 2014)       Diagnostic Studies  Results Reviewed       Procedure Component Value Units Date/Time    Strep A PCR [[id number]]  (Normal) Collected: 06/28/24 2014    Lab Status: Final result Specimen: Throat Updated: 06/28/24 2045     STREP A PCR Not Detected                   No orders to display              Procedures  Procedures         ED Course                                             Medical Decision Making  Patient with rash to hands, feet and mouth c/w HFM disease.  Will test for strep.  Vitals stable, will d/c with close f/u with PCP.    Amount and/or Complexity of Data Reviewed  Labs: ordered.    Risk  OTC drugs.             Disposition  Final diagnoses:   Hand, foot and mouth disease     Time reflects when diagnosis was documented in both MDM as applicable and the Disposition within this note        Time User Action Codes Description Comment    6/28/2024  8:48 PM Negar Sawyer Add [B08.4] Hand, foot and mouth disease           ED Disposition       ED Disposition   Discharge    Condition   Stable    Date/Time   Fri Jun 28, 2024  8:48 PM    Comment   iRana Conn discharge to home/self care.                   Follow-up Information       Follow up With Specialties Details Why Contact Info    Serene Brambila DO Family Medicine Schedule an appointment as soon as possible for a visit in 3 days As needed, For recheck [address] [id number] [phone]              Discharge Medication List as of 6/28/2024  8:49 PM        CONTINUE these medications which have NOT CHANGED    Details   benzonatate (TESSALON PERLES) 100 mg capsule Take 2 capsules (200 mg total) by mouth 3 (three) times a day as needed for cough, Starting Mon 3/4/2024, Normal      fluticasone (FLONASE) 50 mcg/act nasal spray 2 sprays into each nostril daily, Starting Mon 3/4/2024, Until Wed 4/3/2024, Normal      Lactobacillus (PROBIOTIC ACIDOPHILUS PO) Take by mouth, Historical Med      methylPREDNISolone 4 MG tablet therapy pack Use as directed on package, Normal      omeprazole (PriLOSEC) 20 mg delayed release capsule TAKE 1 CAPSULE BY MOUTH EVERY DAY, Starting Wed 6/12/2024, Normal      ondansetron (ZOFRAN) 4 mg tablet Take 1 tablet (4 mg total) by mouth every 8 (eight) hours as needed for nausea or vomiting, Starting Wed 12/13/2023, Normal      PREBIOTIC PRODUCT PO Take by mouth, Historical Med      predniSONE 10 mg tablet Take 4 tablets orally for 3 days,  Then 3 tablets for 3 days, then 2 tablets for 3 days, then 1 tablet for 3 days., Normal             No discharge procedures on file.    PDMP Review       None            ED Provider  Electronically Signed by             Negar Sawyer PA-C  06/28/24 8188

## 2024-06-29 NOTE — DISCHARGE INSTRUCTIONS
Rest, increase fluids.  Tylenol/motrin for discomfort.  Do not return to work until rash resolves.

## 2024-07-01 ENCOUNTER — OFFICE VISIT (OUTPATIENT)
Dept: FAMILY MEDICINE CLINIC | Facility: CLINIC | Age: 24
End: 2024-07-01
Payer: COMMERCIAL

## 2024-07-01 VITALS
OXYGEN SATURATION: 99 % | TEMPERATURE: 98.7 F | BODY MASS INDEX: 31.69 KG/M2 | SYSTOLIC BLOOD PRESSURE: 128 MMHG | RESPIRATION RATE: 17 BRPM | HEART RATE: 75 BPM | DIASTOLIC BLOOD PRESSURE: 82 MMHG | WEIGHT: 185.6 LBS | HEIGHT: 64 IN

## 2024-07-01 DIAGNOSIS — B08.4 HAND, FOOT AND MOUTH DISEASE: Primary | ICD-10-CM

## 2024-07-01 PROCEDURE — 99213 OFFICE O/P EST LOW 20 MIN: CPT | Performed by: FAMILY MEDICINE

## 2024-07-01 NOTE — LETTER
July 1, 2024     Patient: Riana Conn  YOB: 2000  Date of Visit: 7/1/2024      To Whom it May Concern:    Riana Conn is under my professional care. Riana was seen in my office on 7/1/2024. Riana may return to work on 7/3 . Please excuse 6/29, 7/1, 7/2.     If you have any questions or concerns, please don't hesitate to call.         Sincerely,          Luis Joseph, DO        CC: No Recipients

## 2024-07-01 NOTE — PROGRESS NOTES
"    Assessment/Plan:     Diagnoses and all orders for this visit:    Hand, foot and mouth disease        Hand-foot-and-mouth disease is resolving  Work note given  Ivan ibuprofen as needed  Should be self-limiting and resolve in the next few days    Subjective:     Chief Complaint   Patient presents with    ER Follow up     Pt seen in ER on 6/28 with hand foot and mouth disease. Pt reports in mouth are sores today, however hands are feeling better. Taking tylenol as needed        Patient ID: Riana Conn is a 24 y.o. female.    Patient presents today for follow-up of ER visit  Patient was diagnosed with hand-foot-and-mouth disease  Her hands and her feet are clearing up  She still has some soreness in her mouth  But overall feels better with no other complaints        The following portions of the patient's history were reviewed and updated as appropriate: allergies, current medications, past family history, past medical history, past social history, past surgical history and problem list.    Review of Systems   Constitutional: Negative.    HENT: Negative.     Eyes: Negative.    Respiratory: Negative.     Cardiovascular: Negative.    Gastrointestinal: Negative.    Endocrine: Negative.    Genitourinary: Negative.    Musculoskeletal: Negative.    Skin: Negative.    Allergic/Immunologic: Negative.    Neurological: Negative.    Hematological: Negative.    Psychiatric/Behavioral: Negative.     All other systems reviewed and are negative.        Objective:    Vitals:    07/01/24 1532   BP: 128/82   BP Location: Left arm   Patient Position: Sitting   Cuff Size: Standard   Pulse: 75   Resp: 17   Temp: 98.7 °F (37.1 °C)   TempSrc: Tympanic   SpO2: 99%   Weight: 84.2 kg (185 lb 9.6 oz)   Height: 5' 4\" (1.626 m)          Physical Exam  Vitals and nursing note reviewed.   Constitutional:       Appearance: Normal appearance. She is well-developed.   HENT:      Head: Normocephalic and atraumatic.      Right Ear: External " ear normal.      Left Ear: External ear normal.   Eyes:      Conjunctiva/sclera: Conjunctivae normal.      Pupils: Pupils are equal, round, and reactive to light.   Cardiovascular:      Rate and Rhythm: Normal rate and regular rhythm.      Heart sounds: Normal heart sounds.   Pulmonary:      Effort: Pulmonary effort is normal.      Breath sounds: Normal breath sounds.   Abdominal:      General: Bowel sounds are normal.      Palpations: Abdomen is soft.   Musculoskeletal:         General: Normal range of motion.      Cervical back: Normal range of motion.   Skin:     General: Skin is warm and dry.   Neurological:      General: No focal deficit present.      Mental Status: She is alert and oriented to person, place, and time.      Deep Tendon Reflexes: Reflexes are normal and symmetric.   Psychiatric:         Behavior: Behavior normal.         Thought Content: Thought content normal.         Judgment: Judgment normal.